# Patient Record
Sex: FEMALE | Race: BLACK OR AFRICAN AMERICAN | Employment: FULL TIME | URBAN - METROPOLITAN AREA
[De-identification: names, ages, dates, MRNs, and addresses within clinical notes are randomized per-mention and may not be internally consistent; named-entity substitution may affect disease eponyms.]

---

## 2017-05-30 ENCOUNTER — HOSPITAL ENCOUNTER (OUTPATIENT)
Dept: MAMMOGRAPHY | Age: 52
Discharge: HOME | End: 2017-05-30
Attending: FAMILY MEDICINE
Payer: COMMERCIAL

## 2017-05-30 DIAGNOSIS — Z12.31 VISIT FOR SCREENING MAMMOGRAM: ICD-10-CM

## 2017-05-30 PROCEDURE — 77067 SCR MAMMO BI INCL CAD: CPT

## 2018-06-04 ENCOUNTER — HOSPITAL ENCOUNTER (OUTPATIENT)
Dept: MAMMOGRAPHY | Age: 53
Discharge: HOME | End: 2018-06-04
Attending: FAMILY MEDICINE
Payer: COMMERCIAL

## 2018-06-04 DIAGNOSIS — Z12.31 VISIT FOR SCREENING MAMMOGRAM: ICD-10-CM

## 2018-06-04 PROCEDURE — 77063 BREAST TOMOSYNTHESIS BI: CPT

## 2018-07-31 ENCOUNTER — HOSPITAL ENCOUNTER (OUTPATIENT)
Dept: ULTRASOUND IMAGING | Age: 53
Discharge: HOME | End: 2018-07-31
Attending: SPECIALIST
Payer: COMMERCIAL

## 2018-07-31 DIAGNOSIS — E11.9 DIABETES MELLITUS (HCC): ICD-10-CM

## 2018-07-31 DIAGNOSIS — E78.00 HYPERCHOLESTEROLEMIA: ICD-10-CM

## 2018-07-31 DIAGNOSIS — R79.89 ABNORMAL LIVER FUNCTION TEST: ICD-10-CM

## 2018-07-31 PROCEDURE — 76700 US EXAM ABDOM COMPLETE: CPT

## 2019-05-01 ENCOUNTER — HOSPITAL ENCOUNTER (EMERGENCY)
Age: 54
Discharge: HOME | End: 2019-05-01
Attending: EMERGENCY MEDICINE
Payer: COMMERCIAL

## 2019-05-01 VITALS
HEART RATE: 66 BPM | TEMPERATURE: 98.6 F | RESPIRATION RATE: 18 BRPM | DIASTOLIC BLOOD PRESSURE: 84 MMHG | SYSTOLIC BLOOD PRESSURE: 133 MMHG | OXYGEN SATURATION: 99 %

## 2019-05-01 DIAGNOSIS — M54.50 ACUTE BILATERAL LOW BACK PAIN WITHOUT SCIATICA: Primary | ICD-10-CM

## 2019-05-01 LAB
ALBUMIN SERPL-MCNC: 4.1 G/DL (ref 3.5–5)
ALBUMIN/GLOB SERPL: 1 {RATIO} (ref 1.1–2.2)
ALP SERPL-CCNC: 123 U/L (ref 45–117)
ALT SERPL-CCNC: 75 U/L (ref 12–78)
ANION GAP SERPL CALC-SCNC: 8 MMOL/L (ref 5–15)
APPEARANCE UR: ABNORMAL
AST SERPL-CCNC: 58 U/L (ref 15–37)
BACTERIA URNS QL MICRO: NEGATIVE /HPF
BASOPHILS # BLD: 0.1 K/UL (ref 0–0.1)
BASOPHILS NFR BLD: 1 % (ref 0–1)
BILIRUB SERPL-MCNC: 0.7 MG/DL (ref 0.2–1)
BILIRUB UR QL: NEGATIVE
BUN SERPL-MCNC: 16 MG/DL (ref 6–20)
BUN/CREAT SERPL: 17 (ref 12–20)
CALCIUM SERPL-MCNC: 9.6 MG/DL (ref 8.5–10.1)
CHLORIDE SERPL-SCNC: 105 MMOL/L (ref 97–108)
CK SERPL-CCNC: 81 U/L (ref 26–192)
CO2 SERPL-SCNC: 27 MMOL/L (ref 21–32)
COLOR UR: ABNORMAL
COMMENT, HOLDF: NORMAL
CREAT SERPL-MCNC: 0.92 MG/DL (ref 0.55–1.02)
DIFFERENTIAL METHOD BLD: ABNORMAL
EOSINOPHIL # BLD: 0.1 K/UL (ref 0–0.4)
EOSINOPHIL NFR BLD: 1 % (ref 0–7)
EPITH CASTS URNS QL MICRO: ABNORMAL /LPF
ERYTHROCYTE [DISTWIDTH] IN BLOOD BY AUTOMATED COUNT: 17 % (ref 11.5–14.5)
GLOBULIN SER CALC-MCNC: 4.1 G/DL (ref 2–4)
GLUCOSE SERPL-MCNC: 107 MG/DL (ref 65–100)
GLUCOSE UR STRIP.AUTO-MCNC: NEGATIVE MG/DL
HCT VFR BLD AUTO: 39.8 % (ref 35–47)
HGB BLD-MCNC: 11.9 G/DL (ref 11.5–16)
HGB UR QL STRIP: NEGATIVE
HYALINE CASTS URNS QL MICRO: ABNORMAL /LPF (ref 0–5)
IMM GRANULOCYTES # BLD AUTO: 0.1 K/UL (ref 0–0.04)
IMM GRANULOCYTES NFR BLD AUTO: 1 % (ref 0–0.5)
KETONES UR QL STRIP.AUTO: ABNORMAL MG/DL
LEUKOCYTE ESTERASE UR QL STRIP.AUTO: NEGATIVE
LYMPHOCYTES # BLD: 2.6 K/UL (ref 0.8–3.5)
LYMPHOCYTES NFR BLD: 26 % (ref 12–49)
MCH RBC QN AUTO: 23 PG (ref 26–34)
MCHC RBC AUTO-ENTMCNC: 29.9 G/DL (ref 30–36.5)
MCV RBC AUTO: 76.8 FL (ref 80–99)
MONOCYTES # BLD: 0.7 K/UL (ref 0–1)
MONOCYTES NFR BLD: 7 % (ref 5–13)
NEUTS SEG # BLD: 6.3 K/UL (ref 1.8–8)
NEUTS SEG NFR BLD: 64 % (ref 32–75)
NITRITE UR QL STRIP.AUTO: NEGATIVE
NRBC # BLD: 0 K/UL (ref 0–0.01)
NRBC BLD-RTO: 0 PER 100 WBC
PH UR STRIP: 6 [PH] (ref 5–8)
PLATELET # BLD AUTO: 292 K/UL (ref 150–400)
PMV BLD AUTO: 9.4 FL (ref 8.9–12.9)
POTASSIUM SERPL-SCNC: 3.2 MMOL/L (ref 3.5–5.1)
PROT SERPL-MCNC: 8.2 G/DL (ref 6.4–8.2)
PROT UR STRIP-MCNC: NEGATIVE MG/DL
RBC # BLD AUTO: 5.18 M/UL (ref 3.8–5.2)
RBC #/AREA URNS HPF: ABNORMAL /HPF (ref 0–5)
SAMPLES BEING HELD,HOLD: NORMAL
SODIUM SERPL-SCNC: 140 MMOL/L (ref 136–145)
SP GR UR REFRACTOMETRY: 1.02 (ref 1–1.03)
UR CULT HOLD, URHOLD: NORMAL
UROBILINOGEN UR QL STRIP.AUTO: 0.2 EU/DL (ref 0.2–1)
WBC # BLD AUTO: 9.8 K/UL (ref 3.6–11)
WBC URNS QL MICRO: ABNORMAL /HPF (ref 0–4)

## 2019-05-01 PROCEDURE — 80053 COMPREHEN METABOLIC PANEL: CPT

## 2019-05-01 PROCEDURE — 81001 URINALYSIS AUTO W/SCOPE: CPT

## 2019-05-01 PROCEDURE — 74011250636 HC RX REV CODE- 250/636

## 2019-05-01 PROCEDURE — 99283 EMERGENCY DEPT VISIT LOW MDM: CPT

## 2019-05-01 PROCEDURE — 36415 COLL VENOUS BLD VENIPUNCTURE: CPT

## 2019-05-01 PROCEDURE — 96374 THER/PROPH/DIAG INJ IV PUSH: CPT

## 2019-05-01 PROCEDURE — 85025 COMPLETE CBC W/AUTO DIFF WBC: CPT

## 2019-05-01 PROCEDURE — 82550 ASSAY OF CK (CPK): CPT

## 2019-05-01 PROCEDURE — 74011250636 HC RX REV CODE- 250/636: Performed by: PHYSICIAN ASSISTANT

## 2019-05-01 PROCEDURE — 96375 TX/PRO/DX INJ NEW DRUG ADDON: CPT

## 2019-05-01 PROCEDURE — 74011250637 HC RX REV CODE- 250/637: Performed by: PHYSICIAN ASSISTANT

## 2019-05-01 PROCEDURE — 74011000250 HC RX REV CODE- 250: Performed by: PHYSICIAN ASSISTANT

## 2019-05-01 RX ORDER — KETOROLAC TROMETHAMINE 30 MG/ML
30 INJECTION, SOLUTION INTRAMUSCULAR; INTRAVENOUS
Status: DISCONTINUED | OUTPATIENT
Start: 2019-05-01 | End: 2019-05-01

## 2019-05-01 RX ORDER — MORPHINE SULFATE 2 MG/ML
2 INJECTION, SOLUTION INTRAMUSCULAR; INTRAVENOUS
Status: COMPLETED | OUTPATIENT
Start: 2019-05-01 | End: 2019-05-01

## 2019-05-01 RX ORDER — LIDOCAINE 50 MG/G
1 PATCH TOPICAL EVERY 24 HOURS
Status: DISCONTINUED | OUTPATIENT
Start: 2019-05-01 | End: 2019-05-01 | Stop reason: HOSPADM

## 2019-05-01 RX ORDER — LIDOCAINE 40 MG/G
CREAM TOPICAL
Qty: 15 G | Refills: 0 | Status: SHIPPED | OUTPATIENT
Start: 2019-05-01 | End: 2020-02-13

## 2019-05-01 RX ORDER — MORPHINE SULFATE 2 MG/ML
4 INJECTION, SOLUTION INTRAMUSCULAR; INTRAVENOUS ONCE
Status: DISCONTINUED | OUTPATIENT
Start: 2019-05-01 | End: 2019-05-01

## 2019-05-01 RX ORDER — ACETAMINOPHEN 500 MG
1000 TABLET ORAL
Status: COMPLETED | OUTPATIENT
Start: 2019-05-01 | End: 2019-05-01

## 2019-05-01 RX ORDER — METHOCARBAMOL 500 MG/1
500 TABLET, FILM COATED ORAL
Qty: 20 TAB | Refills: 0 | Status: SHIPPED | OUTPATIENT
Start: 2019-05-01 | End: 2020-02-13

## 2019-05-01 RX ORDER — MORPHINE SULFATE 2 MG/ML
INJECTION, SOLUTION INTRAMUSCULAR; INTRAVENOUS
Status: COMPLETED
Start: 2019-05-01 | End: 2019-05-01

## 2019-05-01 RX ORDER — KETOROLAC TROMETHAMINE 30 MG/ML
15 INJECTION, SOLUTION INTRAMUSCULAR; INTRAVENOUS
Status: COMPLETED | OUTPATIENT
Start: 2019-05-01 | End: 2019-05-01

## 2019-05-01 RX ORDER — MELOXICAM 15 MG/1
15 TABLET ORAL DAILY
Qty: 30 TAB | Refills: 0 | Status: SHIPPED | OUTPATIENT
Start: 2019-05-01 | End: 2019-05-31

## 2019-05-01 RX ADMIN — ACETAMINOPHEN 1000 MG: 500 TABLET ORAL at 13:23

## 2019-05-01 RX ADMIN — MORPHINE SULFATE 2 MG: 2 INJECTION, SOLUTION INTRAMUSCULAR; INTRAVENOUS at 13:33

## 2019-05-01 RX ADMIN — KETOROLAC TROMETHAMINE 15 MG: 30 INJECTION, SOLUTION INTRAMUSCULAR at 13:24

## 2019-05-01 NOTE — ED TRIAGE NOTES
Star presents from home with complaints of low back pain that started on Monday when she was sitting on the couch. Patient reports 10/10 low back pain that is spreading to her hips and down her thighs bilaterally. Patient denies trauma

## 2019-05-01 NOTE — LETTER
[address] 
[address] [facility] [address] [id number] 
[phone] Work/School Note Date: 5/1/2019 To Whom It May concern: 
 
Yao Fountain was seen and treated today in the emergency room by the following provider(s): 
Attending Provider: Nazia Navas DO Physician Assistant: DWIGHT Cross. Yao Fountain may return to work on 5/7/19. Sincerely, DWIGHT Funez

## 2019-05-01 NOTE — ED PROVIDER NOTES
45 y/o female with PMHx of DM, HTN, HLD, heart murmur, and GERD, presenting with complaint of back pain. The patient reports an onset of lower back pain 2 days ago while she was sitting down. She denies any recent falls, heavy lifting or other injuries, but states she has had similar pain in the past and has seen an orthopedist. She was seen at [address] [place] [address] ED on 3/28 for similar pain, was diagnosed with a UTI, and was treated with keflex and flexeril. Her pain is currently 10/10, radiating to bilateral thighs, not relieved by flexeril, aleve or heating pad. She is concerned her pain might be related to her statin medication. She denies fevers, weakness, numbness, saddle anesthesia, bladder/bowel dysfunction, or dysuria. The history is provided by the patient. Past Medical History:  
Diagnosis Date  Diabetes ([address]  GERD (gastroesophageal reflux disease)  Heart murmur  Hyperlipemia  Hypertension  Tachycardia Past Surgical History:  
Procedure Laterality Date  BREAST SURGERY PROCEDURE UNLISTED    
 LEFT BREAST MILK DUCT REMOVED  CARDIAC SURG PROCEDURE UNLIST  1/2015 CARDIAC CATH  
 HX BREAST BIOPSY Left [id number]  
 all benign  HX HEENT    
 ZACARIAS EYE MUSCLE TIGHTENING AS CHILD  
 HX ISABEL AND BSO  HX TONSIL AND ADENOIDECTOMY    
 AGE 15  
 HX TUBAL LIGATION Family History:  
Problem Relation Age of Onset  Diabetes Mother  Elevated Lipids Mother  Hypertension Mother  Heart Disease Father AFIB  Hypertension Father  Elevated Lipids Father  Diabetes Father  Cancer Brother BRAIN  
 Breast Cancer Paternal Aunt  Anesth Problems Neg Hx Social History Socioeconomic History  Marital status:  Spouse name: Not on file  Number of children: Not on file  Years of education: Not on file  Highest education level: Not on file Occupational History  Not on file Social Needs  Financial resource strain: Not on file  Food insecurity:  
  Worry: Not on file Inability: Not on file  Transportation needs:  
  Medical: Not on file Non-medical: Not on file Tobacco Use  Smoking status: Never Smoker  Smokeless tobacco: Never Used Substance and Sexual Activity  Alcohol use: Yes Comment: SOCIALLY  Drug use: Not on file  Sexual activity: Not on file Lifestyle  Physical activity:  
  Days per week: Not on file Minutes per session: Not on file  Stress: Not on file Relationships  Social connections:  
  Talks on phone: Not on file Gets together: Not on file Attends Jehovah's witness service: Not on file Active member of club or organization: Not on file Attends meetings of clubs or organizations: Not on file Relationship status: Not on file  Intimate partner violence:  
  Fear of current or ex partner: Not on file Emotionally abused: Not on file Physically abused: Not on file Forced sexual activity: Not on file Other Topics Concern  Not on file Social History Narrative  Not on file ALLERGIES: Patient has no known allergies. Review of Systems Constitutional: Negative for chills and fever. Respiratory: Negative for shortness of breath. Cardiovascular: Negative for chest pain. Gastrointestinal: Negative for diarrhea, nausea and vomiting. Genitourinary: Negative for dysuria, frequency and urgency. Musculoskeletal: Positive for back pain. Negative for neck pain. Neurological: Negative for weakness and numbness. All other systems reviewed and are negative. Vitals:  
 05/01/19 1216 05/01/19 1249 BP:  (!) 170/97 Pulse: 90 82 Resp:  16 Temp:  98.3 °F (36.8 °C) SpO2: 99% 100% Physical Exam  
Constitutional: She is oriented to person, place, and time. She appears well-developed and well-nourished. No distress. HENT:  
Head: Normocephalic and atraumatic. Eyes: Conjunctivae and EOM are normal.  
Neck: Normal range of motion. Neck supple. Cardiovascular: Normal rate, regular rhythm and normal heart sounds. Pulmonary/Chest: Effort normal and breath sounds normal.  
Neurological: She is alert and oriented to person, place, and time. 5/5 strength of bilateral lower extremities with intact light touch sensation. Ambulatory without assistance. Skin: Skin is warm and dry. She is not diaphoretic. Nursing note and vitals reviewed. MDM Number of Diagnoses or Management Options Acute bilateral low back pain without sciatica:  
  
Amount and/or Complexity of Data Reviewed Clinical lab tests: ordered and reviewed Patient Progress Patient progress: stable Procedures 45 y/o female with PMHx of DM, HTN, HLD, heart murmur, and GERD, presenting with complaint of back pain. History and exam as documented above, most consistent with muscular back pain vs small disc herniation. No history of trauma or midline tenderness - doubt fractures or ligamentous injuries. No neuro deficits or history of saddle anesthesia or bladder/bowel dysfunction - doubt cauda equina syndrome or acute cord compression. Nothing in the history or exam to concern me for AAA, renal pathology or epidural abscess. Safe for discharge home, with Rx for mobic, robaxin and topical lidocaine cream. Recommended prompt PCP follow up. Strict ED return precautions discussed and provided in writing at time of discharge. The patient verbalized understanding and agreement with this plan.

## 2019-07-24 ENCOUNTER — HOSPITAL ENCOUNTER (OUTPATIENT)
Dept: MAMMOGRAPHY | Age: 54
Discharge: HOME | End: 2019-07-24
Attending: FAMILY MEDICINE
Payer: COMMERCIAL

## 2019-07-24 DIAGNOSIS — Z12.39 SCREENING BREAST EXAMINATION: ICD-10-CM

## 2019-07-24 PROCEDURE — 77063 BREAST TOMOSYNTHESIS BI: CPT

## 2020-01-13 ENCOUNTER — OFFICE VISIT (OUTPATIENT)
Dept: CARDIOLOGY CLINIC | Age: 55
End: 2020-01-13

## 2020-01-13 ENCOUNTER — CLINICAL SUPPORT (OUTPATIENT)
Dept: CARDIOLOGY CLINIC | Age: 55
End: 2020-01-13

## 2020-01-13 VITALS
RESPIRATION RATE: 16 BRPM | SYSTOLIC BLOOD PRESSURE: 138 MMHG | HEART RATE: 82 BPM | BODY MASS INDEX: 36.76 KG/M2 | WEIGHT: 234.2 LBS | DIASTOLIC BLOOD PRESSURE: 86 MMHG | HEIGHT: 67 IN | OXYGEN SATURATION: 98 %

## 2020-01-13 DIAGNOSIS — R00.0 RAPID HEART RATE: Primary | ICD-10-CM

## 2020-01-13 DIAGNOSIS — E66.01 SEVERE OBESITY (HCC): ICD-10-CM

## 2020-01-13 DIAGNOSIS — R00.0 RAPID HEART RATE: ICD-10-CM

## 2020-01-13 DIAGNOSIS — R42 DIZZINESS: ICD-10-CM

## 2020-01-13 DIAGNOSIS — R00.0 TACHYCARDIA: ICD-10-CM

## 2020-01-13 DIAGNOSIS — R00.2 PALPITATIONS: ICD-10-CM

## 2020-01-13 DIAGNOSIS — R07.9 CHEST PAIN, UNSPECIFIED TYPE: ICD-10-CM

## 2020-01-13 RX ORDER — ATORVASTATIN CALCIUM 10 MG/1
1 TABLET, FILM COATED ORAL DAILY
COMMUNITY
Start: 2019-01-12

## 2020-01-13 RX ORDER — METOPROLOL TARTRATE 50 MG/1
1 TABLET ORAL 2 TIMES DAILY
COMMUNITY
Start: 2019-02-11 | End: 2020-01-28 | Stop reason: ALTCHOICE

## 2020-01-13 NOTE — PROGRESS NOTES
HISTORY OF PRESENT ILLNESS  Adelita Hedrick is a 53 y.o. female. She is seen for evaluation of paroxysmal tachycardia and palpitations. I saw her five years ago and she had a normal heart catheterization at that time for chest discomfort and abnormal stress testing. In May 2019 she was noted on lab work to have borderline anemia with low mean corpuscular volume and low potassium. Recently she has been having episodes where her heart rate will go to 130-150 suddenly and many times, it occurs at night. She also has occasional chest tightness and dizziness but states she can walk without difficulty. She may have lost some weight. She does not smoke or drink alcohol to excess. Her family history is noncontributory. HPI  Patient Active Problem List   Diagnosis Code    Tear of meniscus of left knee S83.207A    Severe obesity ([facility] E66.01     Current Outpatient Medications   Medication Sig Dispense Refill    metoprolol tartrate (LOPRESSOR) 50 mg tablet Take 1 Tab by mouth two (2) times a day.  SITagliptin-metFORMIN (JANUMET) 50-1,000 mg per tablet Take 1 Tab by mouth two (2) times a day.  atorvastatin (LIPITOR) 10 mg tablet Take 1 Tab by mouth daily.  aspirin 81 mg chewable tablet Take 81 mg by mouth daily.  triamterene-hydrochlorothiazide (DYAZIDE) 37.5-25 mg per capsule Take 1 capsule by mouth daily.  lidocaine (XYLOCAINE) 4 % topical cream Apply  to affected area two (2) times daily as needed for Pain. 15 g 0    methocarbamol (ROBAXIN) 500 mg tablet Take 1 Tab by mouth two (2) times daily as needed for Pain. 20 Tab 0    oxyCODONE-acetaminophen (PERCOCET) 5-325 mg per tablet Take 1 Tab by mouth every four (4) hours as needed for Pain. Max Daily Amount: 6 Tabs. 42 Tab 0    metFORMIN (GLUCOPHAGE) 500 mg tablet Take 1 tablet by mouth two (2) times daily (with meals). 60 tablet 11    ATORVASTATIN CALCIUM (ATORVASTATIN PO) Take 10 mg by mouth daily.        Past Medical History:   Diagnosis Date    Diabetes ([address]     GERD (gastroesophageal reflux disease)     Heart murmur     Hyperlipemia     Hypertension     Tachycardia      Past Surgical History:   Procedure Laterality Date    BREAST SURGERY PROCEDURE UNLISTED      LEFT BREAST MILK DUCT REMOVED    CARDIAC SURG PROCEDURE UNLIST  1/2015    CARDIAC CATH    HX BREAST BIOPSY Left 1990,2003,2008    all benign    HX HEENT      ZACARIAS EYE MUSCLE TIGHTENING AS CHILD    HX ISABEL AND BSO      HX TONSIL AND ADENOIDECTOMY      AGE 15    HX TUBAL LIGATION         Review of Systems   Constitutional: Positive for weight loss. Cardiovascular: Positive for chest pain and palpitations. Neurological: Positive for dizziness. All other systems reviewed and are negative. Visit Vitals  /86 (BP 1 Location: Left arm, BP Patient Position: Sitting)   Pulse 82   Resp 16   Ht 5' 7\" (1.702 m)   Wt 234 lb 3.2 oz (106.2 kg)   SpO2 98%   BMI 36.68 kg/m²       Physical Exam  Vitals signs and nursing note reviewed. Constitutional:       Appearance: Normal appearance. HENT:      Head: Normocephalic. Right Ear: External ear normal.      Nose: Nose normal.      Mouth/Throat:      Mouth: Mucous membranes are moist.   Neck:      Musculoskeletal: Normal range of motion. Cardiovascular:      Rate and Rhythm: Normal rate and regular rhythm. Heart sounds: No murmur. No friction rub. No gallop. Pulmonary:      Effort: Pulmonary effort is normal. No respiratory distress. Breath sounds: No wheezing. Chest:      Chest wall: No tenderness. Abdominal:      Palpations: Abdomen is soft. Musculoskeletal:         General: No swelling. Skin:     Coloration: Skin is not jaundiced. Neurological:      General: No focal deficit present. Mental Status: She is alert.    Psychiatric:         Behavior: Behavior normal.         ASSESSMENT and PLAN  She is now having episodic palpitations which are worrisome for supraventricular arrhythmias possibly atrial fibrillation. I will therefore start with a 48-hour monitor and also have her return for an echocardiogram once the monitor is completed for follow up. She is on a beta blocker at present.

## 2020-01-20 ENCOUNTER — TELEPHONE (OUTPATIENT)
Dept: CARDIOLOGY CLINIC | Age: 55
End: 2020-01-20

## 2020-01-28 ENCOUNTER — OFFICE VISIT (OUTPATIENT)
Dept: CARDIOLOGY CLINIC | Age: 55
End: 2020-01-28

## 2020-01-28 VITALS
OXYGEN SATURATION: 98 % | SYSTOLIC BLOOD PRESSURE: 138 MMHG | HEIGHT: 67 IN | DIASTOLIC BLOOD PRESSURE: 86 MMHG | BODY MASS INDEX: 36.73 KG/M2 | WEIGHT: 234 LBS | HEART RATE: 86 BPM | RESPIRATION RATE: 18 BRPM

## 2020-01-28 DIAGNOSIS — R42 DIZZINESS: ICD-10-CM

## 2020-01-28 DIAGNOSIS — R00.0 RAPID HEART RATE: ICD-10-CM

## 2020-01-28 DIAGNOSIS — I11.0 HYPERTENSIVE HEART DISEASE WITH CONGESTIVE HEART FAILURE, UNSPECIFIED HEART FAILURE TYPE (HCC): ICD-10-CM

## 2020-01-28 DIAGNOSIS — R00.2 PALPITATIONS: Primary | ICD-10-CM

## 2020-01-28 DIAGNOSIS — E66.01 SEVERE OBESITY (HCC): ICD-10-CM

## 2020-01-28 DIAGNOSIS — R00.0 TACHYCARDIA: ICD-10-CM

## 2020-01-28 DIAGNOSIS — I10 ESSENTIAL HYPERTENSION: ICD-10-CM

## 2020-01-28 RX ORDER — CARVEDILOL 12.5 MG/1
12.5 TABLET ORAL 2 TIMES DAILY WITH MEALS
Qty: 60 TAB | Refills: 5 | Status: SHIPPED | OUTPATIENT
Start: 2020-01-28 | End: 2020-02-13

## 2020-01-28 RX ORDER — LOSARTAN POTASSIUM AND HYDROCHLOROTHIAZIDE 12.5; 5 MG/1; MG/1
1 TABLET ORAL DAILY
Qty: 30 TAB | Refills: 5 | Status: SHIPPED | OUTPATIENT
Start: 2020-01-28 | End: 2020-02-13 | Stop reason: DRUGHIGH

## 2020-01-28 NOTE — PROGRESS NOTES
HISTORY OF PRESENT ILLNESS  Yao Fountain is a 53 y.o. female. She is seen in follow up for palpitations and treated hypertension. The palpitations may be slightly diminished with cutting back on chocolate usage but they are still present once a day. Last year blood work in May showed a low potassium. She takes Dyazide and also metoprolol twice daily. An echocardiogram in the office today showed normal left ventricular function but there was mild to moderate left ventricular hypertrophy and some left atrial enlargement. Mild mitral regurgitation was noted. HPI  Patient Active Problem List   Diagnosis Code    Tear of meniscus of left knee S83.207A    Severe obesity ([facility] E66.01     Current Outpatient Medications   Medication Sig Dispense Refill    losartan-hydroCHLOROthiazide (HYZAAR) 50-12.5 mg per tablet Take 1 Tab by mouth daily. 30 Tab 5    carvediloL (COREG) 12.5 mg tablet Take 1 Tab by mouth two (2) times daily (with meals). 60 Tab 5    SITagliptin-metFORMIN (JANUMET) 50-1,000 mg per tablet Take 1 Tab by mouth two (2) times a day.  atorvastatin (LIPITOR) 10 mg tablet Take 1 Tab by mouth daily.  lidocaine (XYLOCAINE) 4 % topical cream Apply  to affected area two (2) times daily as needed for Pain. 15 g 0    methocarbamol (ROBAXIN) 500 mg tablet Take 1 Tab by mouth two (2) times daily as needed for Pain. 20 Tab 0    aspirin 81 mg chewable tablet Take 81 mg by mouth daily.        Past Medical History:   Diagnosis Date    Diabetes ([facility] [address] [facility]     GERD (gastroesophageal reflux disease)     Heart murmur     Hyperlipemia     Hypertension     Tachycardia      Past Surgical History:   Procedure Laterality Date    BREAST SURGERY PROCEDURE UNLISTED      LEFT BREAST MILK DUCT REMOVED    CARDIAC SURG PROCEDURE UNLIST  1/2015    CARDIAC CATH    HX BREAST BIOPSY Left 1990,2003,2008    all benign    HX HEENT      ZACARIAS EYE MUSCLE TIGHTENING AS CHILD    HX ISABEL AND BSO      HX TONSIL AND ADENOIDECTOMY AGE 15    HX TUBAL LIGATION         Review of Systems   Cardiovascular: Positive for palpitations. All other systems reviewed and are negative. Visit Vitals  /86 (BP 1 Location: Left arm, BP Patient Position: Sitting)   Pulse 86   Resp 18   Ht 5' 7\" (1.702 m)   Wt 234 lb (106.1 kg)   SpO2 98%   BMI 36.65 kg/m²       Physical Exam  Vitals signs and nursing note reviewed. Constitutional:       Appearance: She is obese. HENT:      Head: Normocephalic. Right Ear: External ear normal.      Nose: Nose normal.      Mouth/Throat:      Mouth: Mucous membranes are moist.   Neck:      Musculoskeletal: Normal range of motion. Cardiovascular:      Rate and Rhythm: Normal rate and regular rhythm. Heart sounds: No murmur. No friction rub. No gallop. Pulmonary:      Breath sounds: Normal breath sounds. No wheezing or rales. Abdominal:      Palpations: Abdomen is soft. Musculoskeletal:         General: No swelling. Skin:     Coloration: Skin is not jaundiced. Neurological:      General: No focal deficit present. Mental Status: She is alert. Psychiatric:         Behavior: Behavior normal.         ASSESSMENT and PLAN  Her blood pressure is borderline controlled and she continues to be symptomatic with palpitations. She did have a 48-hour monitor that showed only occasional supraventricular ectopic beats but no atrial fibrillation. I believe she may improve with different blood pressure regimen that may be more effective in controlling her pressure and her ectopy as well as conserving potassium and magnesium. Therefore, I will stop the Dyazide and metoprolol. I will start her instead on Hyzaar 50/12.5 mg once daily as well as carvedilol 12.5 mg twice daily and I will see her back in four weeks.

## 2020-02-05 ENCOUNTER — HOSPITAL ENCOUNTER (EMERGENCY)
Age: 55
Discharge: HOME | End: 2020-02-05
Attending: EMERGENCY MEDICINE
Payer: COMMERCIAL

## 2020-02-05 VITALS
TEMPERATURE: 98 F | DIASTOLIC BLOOD PRESSURE: 91 MMHG | OXYGEN SATURATION: 99 % | RESPIRATION RATE: 18 BRPM | HEART RATE: 72 BPM | SYSTOLIC BLOOD PRESSURE: 162 MMHG

## 2020-02-05 DIAGNOSIS — G44.1 OTHER VASCULAR HEADACHE: Primary | ICD-10-CM

## 2020-02-05 DIAGNOSIS — R19.7 DIARRHEA, UNSPECIFIED TYPE: ICD-10-CM

## 2020-02-05 LAB
ALBUMIN SERPL-MCNC: 4.3 G/DL (ref 3.5–5)
ALBUMIN/GLOB SERPL: 1.1 {RATIO} (ref 1.1–2.2)
ALP SERPL-CCNC: 116 U/L (ref 45–117)
ALT SERPL-CCNC: 35 U/L (ref 12–78)
ANION GAP SERPL CALC-SCNC: 3 MMOL/L (ref 5–15)
AST SERPL-CCNC: 42 U/L (ref 15–37)
BASOPHILS # BLD: 0 K/UL (ref 0–0.1)
BASOPHILS NFR BLD: 0 % (ref 0–1)
BILIRUB SERPL-MCNC: 0.5 MG/DL (ref 0.2–1)
BUN SERPL-MCNC: 12 MG/DL (ref 6–20)
BUN/CREAT SERPL: 13 (ref 12–20)
CALCIUM SERPL-MCNC: 9.6 MG/DL (ref 8.5–10.1)
CHLORIDE SERPL-SCNC: 106 MMOL/L (ref 97–108)
CO2 SERPL-SCNC: 27 MMOL/L (ref 21–32)
COMMENT, HOLDF: NORMAL
CREAT SERPL-MCNC: 0.89 MG/DL (ref 0.55–1.02)
DIFFERENTIAL METHOD BLD: ABNORMAL
EOSINOPHIL # BLD: 0.2 K/UL (ref 0–0.4)
EOSINOPHIL NFR BLD: 1 % (ref 0–7)
ERYTHROCYTE [DISTWIDTH] IN BLOOD BY AUTOMATED COUNT: 19.2 % (ref 11.5–14.5)
GLOBULIN SER CALC-MCNC: 3.8 G/DL (ref 2–4)
GLUCOSE SERPL-MCNC: 151 MG/DL (ref 65–100)
HCT VFR BLD AUTO: 37.1 % (ref 35–47)
HGB BLD-MCNC: 11.2 G/DL (ref 11.5–16)
IMM GRANULOCYTES # BLD AUTO: 0.1 K/UL (ref 0–0.04)
IMM GRANULOCYTES NFR BLD AUTO: 1 % (ref 0–0.5)
LYMPHOCYTES # BLD: 3.7 K/UL (ref 0.8–3.5)
LYMPHOCYTES NFR BLD: 32 % (ref 12–49)
MCH RBC QN AUTO: 23 PG (ref 26–34)
MCHC RBC AUTO-ENTMCNC: 30.2 G/DL (ref 30–36.5)
MCV RBC AUTO: 76.3 FL (ref 80–99)
MONOCYTES # BLD: 0.7 K/UL (ref 0–1)
MONOCYTES NFR BLD: 6 % (ref 5–13)
NEUTS SEG # BLD: 6.8 K/UL (ref 1.8–8)
NEUTS SEG NFR BLD: 60 % (ref 32–75)
NRBC # BLD: 0 K/UL (ref 0–0.01)
NRBC BLD-RTO: 0 PER 100 WBC
PLATELET # BLD AUTO: 262 K/UL (ref 150–400)
PMV BLD AUTO: 9.7 FL (ref 8.9–12.9)
POTASSIUM SERPL-SCNC: 4.8 MMOL/L (ref 3.5–5.1)
PROT SERPL-MCNC: 8.1 G/DL (ref 6.4–8.2)
RBC # BLD AUTO: 4.86 M/UL (ref 3.8–5.2)
SAMPLES BEING HELD,HOLD: NORMAL
SODIUM SERPL-SCNC: 136 MMOL/L (ref 136–145)
WBC # BLD AUTO: 11.4 K/UL (ref 3.6–11)

## 2020-02-05 PROCEDURE — 85025 COMPLETE CBC W/AUTO DIFF WBC: CPT

## 2020-02-05 PROCEDURE — 96374 THER/PROPH/DIAG INJ IV PUSH: CPT

## 2020-02-05 PROCEDURE — 36415 COLL VENOUS BLD VENIPUNCTURE: CPT

## 2020-02-05 PROCEDURE — 99284 EMERGENCY DEPT VISIT MOD MDM: CPT

## 2020-02-05 PROCEDURE — 96375 TX/PRO/DX INJ NEW DRUG ADDON: CPT

## 2020-02-05 PROCEDURE — 74011250637 HC RX REV CODE- 250/637: Performed by: NURSE PRACTITIONER

## 2020-02-05 PROCEDURE — 74011250636 HC RX REV CODE- 250/636: Performed by: NURSE PRACTITIONER

## 2020-02-05 PROCEDURE — 80053 COMPREHEN METABOLIC PANEL: CPT

## 2020-02-05 RX ORDER — ONDANSETRON 2 MG/ML
4 INJECTION INTRAMUSCULAR; INTRAVENOUS
Status: COMPLETED | OUTPATIENT
Start: 2020-02-05 | End: 2020-02-05

## 2020-02-05 RX ORDER — BUTALBITAL, ACETAMINOPHEN AND CAFFEINE 300; 40; 50 MG/1; MG/1; MG/1
1 CAPSULE ORAL
Qty: 15 CAP | Refills: 0 | Status: SHIPPED | OUTPATIENT
Start: 2020-02-05 | End: 2022-03-28

## 2020-02-05 RX ORDER — KETOROLAC TROMETHAMINE 30 MG/ML
30 INJECTION, SOLUTION INTRAMUSCULAR; INTRAVENOUS
Status: COMPLETED | OUTPATIENT
Start: 2020-02-05 | End: 2020-02-05

## 2020-02-05 RX ORDER — BUTALBITAL, ACETAMINOPHEN AND CAFFEINE 50; 325; 40 MG/1; MG/1; MG/1
1 TABLET ORAL
Status: COMPLETED | OUTPATIENT
Start: 2020-02-05 | End: 2020-02-05

## 2020-02-05 RX ADMIN — ONDANSETRON 4 MG: 2 INJECTION INTRAMUSCULAR; INTRAVENOUS at 17:47

## 2020-02-05 RX ADMIN — KETOROLAC TROMETHAMINE 30 MG: 30 INJECTION, SOLUTION INTRAMUSCULAR at 17:47

## 2020-02-05 RX ADMIN — SODIUM CHLORIDE, SODIUM LACTATE, POTASSIUM CHLORIDE, AND CALCIUM CHLORIDE 1000 ML: 600; 310; 30; 20 INJECTION, SOLUTION INTRAVENOUS at 17:47

## 2020-02-05 RX ADMIN — BUTALBITAL, ACETAMINOPHEN AND CAFFEINE 1 TABLET: 50; 325; 40 TABLET ORAL at 19:07

## 2020-02-05 NOTE — ED TRIAGE NOTES
Pt arrives via personal vehicle for c/o generalized HA that started around 11 am today. Pt reports elevated BP (175/100), HR(130s). Denies photophobia, vision changes, dizziness/lightheadedness, numbness/tingling.

## 2020-02-05 NOTE — LETTER
NOTIFICATION RETURN TO WORK / SCHOOL 
 
2/5/2020 7:49 PM 
 
Ms. Sanjeev North 
[address] [facility] [id number] To Whom It May Concern: 
 
Sanjeev North is currently under the care of Elaine Botello [address] [place] [address]. She will return to work/school on: 2/6/2020 If there are questions or concerns please have the patient contact our office. Sincerely, Austen Canada NP

## 2020-02-05 NOTE — ED PROVIDER NOTES
Patient is a [age]year-old female with history of diabetes, heart murmur, hyperlipidemia, hypertension, and tachycardia who presents today for evaluation of a headache. She reports gradual onset of headache that started last night but around 11 AM it felt like it was getting a lot worse. Feels like a vice around her entire head. Endorses nausea with no vomiting. Denies any acute vision changes. Took Aleve prior to arrival with no improvement. Of note, she is followed by cardiologist and has recently had a Holter monitor for complaints of tachycardia and palpitations. She was told that the Holter monitor results were normal and did not show anything acute. Also when she followed up with her cardiologist 8 days ago echo showed normal ejection fraction with mild LVH mild MR. She was taken off metoprolol and Dyazide and started on losartan, hydrochlorothiazide, and carvedilol 12.5 mg. Patient states she still has sensation of racing heart and palpitations. In triage her heart rate was in the 80 and during the interview her heart rate was in the 80s when she was feeling her heart racing. Also noted her blood pressure was elevated as it also is in triage today. Denies any neck pain. Denies any fevers but endorses chills. Denies any cough cold or flu symptoms. Denies any urinary symptoms. 3 days ago she started having diarrhea. States she was on the toilet constantly. Last episode was earlier this morning. Denies any blood. Denies any acute abdominal pain but endorses that her stomach feels like it is (gurgling). No other acute complaints.                Past Medical History:   Diagnosis Date    Diabetes ([address]     GERD (gastroesophageal reflux disease)     Heart murmur     Hyperlipemia     Hypertension     Tachycardia        Past Surgical History:   Procedure Laterality Date    BREAST SURGERY PROCEDURE UNLISTED      LEFT BREAST MILK DUCT REMOVED    CARDIAC SURG PROCEDURE UNLIST  1/2015 CARDIAC CATH    HX BREAST BIOPSY Left 1990,2003,2008    all benign    HX HEENT      ZACARIAS EYE MUSCLE TIGHTENING AS CHILD    HX ISABEL AND BSO      HX TONSIL AND ADENOIDECTOMY      AGE 15    HX TUBAL LIGATION           Family History:   Problem Relation Age of Onset    Diabetes Mother     Elevated Lipids Mother     Hypertension Mother     Heart Disease Father         AFIB    Hypertension Father    Bylas Shaker Elevated Lipids Father     Diabetes Father     Cancer Brother         BRAIN    Breast Cancer Maternal Aunt         Age at diagnosis unknown     Anesth Problems Neg Hx        Social History     Socioeconomic History    Marital status:      Spouse name: Not on file    Number of children: Not on file    Years of education: Not on file    Highest education level: Not on file   Occupational History    Not on file   Social Needs    Financial resource strain: Not on file    Food insecurity:     Worry: Not on file     Inability: Not on file    Transportation needs:     Medical: Not on file     Non-medical: Not on file   Tobacco Use    Smoking status: Never Smoker    Smokeless tobacco: Never Used   Substance and Sexual Activity    Alcohol use: Yes     Frequency: Monthly or less     Drinks per session: 1 or 2     Binge frequency: Never     Comment: SOCIALLY    Drug use: Never    Sexual activity: Not on file   Lifestyle    Physical activity:     Days per week: Not on file     Minutes per session: Not on file    Stress: Not on file   Relationships    Social connections:     Talks on phone: Not on file     Gets together: Not on file     Attends Latter-day service: Not on file     Active member of club or organization: Not on file     Attends meetings of clubs or organizations: Not on file     Relationship status: Not on file    Intimate partner violence:     Fear of current or ex partner: Not on file     Emotionally abused: Not on file     Physically abused: Not on file     Forced sexual activity: Not on file   Other Topics Concern    Not on file   Social History Narrative    Not on file         ALLERGIES: Patient has no known allergies. Review of Systems   Constitutional: Positive for chills. Negative for fever. HENT: Positive for sinus pain. Negative for congestion and sore throat. Eyes: Negative for photophobia, pain and visual disturbance. Respiratory: Negative for shortness of breath. Cardiovascular: Positive for chest pain (Transient, chronic). Gastrointestinal: Positive for abdominal pain, diarrhea and nausea. Negative for vomiting. Genitourinary: Negative for difficulty urinating. Skin: Negative. Neurological: Positive for headaches. Negative for dizziness, light-headedness and numbness. Vitals:    02/05/20 1623   BP: (!) 181/91   Pulse: 85   Resp: 19   Temp: 98.1 °F (36.7 °C)   SpO2: 98%            Physical Exam  Vitals signs and nursing note reviewed. Constitutional:       Appearance: Normal appearance. HENT:      Head: Normocephalic and atraumatic. Comments: Bilateral frontal sinus tenderness to palpation. Mouth/Throat:      Mouth: Mucous membranes are moist.   Eyes:      Extraocular Movements: Extraocular movements intact. Pupils: Pupils are equal, round, and reactive to light. Comments: Mild bilateral conjunctival injection   Neck:      Musculoskeletal: Normal range of motion and neck supple. Cardiovascular:      Rate and Rhythm: Normal rate and regular rhythm. Pulses: Normal pulses. Heart sounds: Normal heart sounds. Pulmonary:      Effort: Pulmonary effort is normal.      Breath sounds: Normal breath sounds. Abdominal:      Palpations: Abdomen is soft. Tenderness: There is no abdominal tenderness. Musculoskeletal: Normal range of motion. Skin:     General: Skin is warm and dry. Neurological:      General: No focal deficit present. Mental Status: She is alert and oriented to person, place, and time.    Psychiatric: Mood and Affect: Mood normal.         Behavior: Behavior normal.          MDM  Number of Diagnoses or Management Options  Diarrhea, unspecified type: Other vascular headache:          Procedures    Patient is a 50-year-old female who presents today with gradual onset of a viselike headache around her head that started last night and got worse around 11:00 today. Endorses nausea with no vomiting. No photophobia. No acute neck pain or fevers or chills. She does endorse 3 days of watery diarrhea. She also recently changed her blood pressure medications about 10 days ago. On examination she has no acute neurological deficits. No gait difficulty. Has history of hypokalemia. Will check electrolytes treat her headache with Toradol and Zofran and give IV fluids and reevaluate. Headache is been gradual.  No trauma. Not anticoagulated. Neurologically she is intact. Will defer CT of the head at this time. 6:59 PM  Overall patient feels little better but her pain in her head is still persistent. Electrolytes are within normal limits. Potassium is normal.  Creatinine is normal.  We discussed options for treatment and will try 1 tablet Fioricet. If symptoms better will discharge home with prescription. Recheck vital signs: T 98.0- HR 74- /91  sats 98% on RA. Will try Fioricet for headache and reevaluate. 7:47 PM  Improvement after Fiorocet. Feeling much better. Ready for discharge. Will discharge her home with a few Fioricet and have her follow-up should her symptoms worsen anyway. Upon discharge her gait is steady. I encouraged her to stay home tonight and rest.  Should her symptoms worsen in any way, she is to return back to the ED.

## 2020-02-06 ENCOUNTER — TELEPHONE (OUTPATIENT)
Dept: CARDIOLOGY CLINIC | Age: 55
End: 2020-02-06

## 2020-02-06 RX ORDER — LOSARTAN POTASSIUM AND HYDROCHLOROTHIAZIDE 25; 100 MG/1; MG/1
1 TABLET ORAL DAILY
COMMUNITY
End: 2020-02-13 | Stop reason: DRUGHIGH

## 2020-02-06 NOTE — TELEPHONE ENCOUNTER
Called patient. Verified patient's identity with two identifiers. She saw Dr. Pamella Robbins January 28th in f/u for palpitations and htn. Medications were changed. She has been taking losartan-hctz 50-12.5 mg daily and carvedilol 12.5 mg bid. Yesterday she went to [address] [place] [address] ER with headache, high BP, fast HR. She had been having diarrhea a couple of days so labs were checked and she received fluids. She was given fioricet and sent home. Patient states today her BP is still elevated with , but sometimes higher. She stated 's yesterday before going to ER. She also still has a headache. She stated she watched salt intake. She asked if Dr. Pamella Robbins could review her labs, notes from yesterday, and advise any other changes. She has a f/u scheduled to see Dr. Pamella Robbins 2/24, which I told her we could move up if Dr. Pamella Robbins advises. Will call her back.        Future Appointments   Date Time Provider Octavia Gallo   2/24/2020  9:40 AM Jeanine Mejia  [address]

## 2020-02-06 NOTE — TELEPHONE ENCOUNTER
Patient requesting to speak with Dr. Belen Petty about some issues she's having.  Please advise    [facility]:[phone]

## 2020-02-06 NOTE — ED NOTES
Discharge paperwork given by provider, ambulated out of the department with a steady gait in no acute distress.

## 2020-02-06 NOTE — TELEPHONE ENCOUNTER
Called patient. Verified patient's identity with two identifiers. Notified patient of Dr. Jamilah Boland advice. She will double her medications and scheduled f/u for next week. Patient verbalized understanding and denied further questions or concerns.          Future Appointments   Date Time Provider Octavia Gallo   2/13/2020  9:40 AM Lisa Moore  [address]   2/24/2020  9:40 AM Lisa Moore  [address]

## 2020-02-06 NOTE — TELEPHONE ENCOUNTER
MD Corona Ray, RN   Caller: Unspecified (Today, 10:17 AM)             Double both new pills, so take total of Coreg 25 bid, and Hyzaar 100/25 every day, see in about a week.

## 2020-02-13 ENCOUNTER — OFFICE VISIT (OUTPATIENT)
Dept: CARDIOLOGY CLINIC | Age: 55
End: 2020-02-13

## 2020-02-13 VITALS
SYSTOLIC BLOOD PRESSURE: 150 MMHG | DIASTOLIC BLOOD PRESSURE: 84 MMHG | HEIGHT: 67 IN | WEIGHT: 232 LBS | RESPIRATION RATE: 17 BRPM | HEART RATE: 82 BPM | OXYGEN SATURATION: 97 % | BODY MASS INDEX: 36.41 KG/M2

## 2020-02-13 DIAGNOSIS — R00.0 TACHYCARDIA: ICD-10-CM

## 2020-02-13 DIAGNOSIS — R42 DIZZINESS: ICD-10-CM

## 2020-02-13 DIAGNOSIS — R00.0 RAPID HEART RATE: ICD-10-CM

## 2020-02-13 DIAGNOSIS — I10 ESSENTIAL HYPERTENSION: Primary | ICD-10-CM

## 2020-02-13 DIAGNOSIS — R00.2 PALPITATIONS: ICD-10-CM

## 2020-02-13 DIAGNOSIS — E66.01 SEVERE OBESITY (HCC): ICD-10-CM

## 2020-02-13 RX ORDER — CARVEDILOL 25 MG/1
25 TABLET ORAL 2 TIMES DAILY WITH MEALS
Qty: 60 TAB | Refills: 11 | Status: SHIPPED | OUTPATIENT
Start: 2020-02-13

## 2020-02-13 RX ORDER — AMLODIPINE BESYLATE 5 MG/1
5 TABLET ORAL DAILY
Qty: 30 TAB | Refills: 11 | Status: SHIPPED | OUTPATIENT
Start: 2020-02-13 | End: 2021-02-17 | Stop reason: SDUPTHER

## 2020-02-13 RX ORDER — LOSARTAN POTASSIUM AND HYDROCHLOROTHIAZIDE 12.5; 1 MG/1; MG/1
1 TABLET ORAL DAILY
Qty: 30 TAB | Refills: 11 | Status: SHIPPED | OUTPATIENT
Start: 2020-02-13 | End: 2021-02-17 | Stop reason: SDUPTHER

## 2020-02-13 NOTE — PROGRESS NOTES
Visit Vitals  /84 (BP 1 Location: Left arm, BP Patient Position: Sitting)   Pulse 82   Resp 17   Ht 5' 7\" (1.702 m)   Wt 232 lb (105.2 kg)   SpO2 97%   BMI 36.34 kg/m²

## 2020-02-13 NOTE — PROGRESS NOTES
HISTORY OF PRESENT ILLNESS  Suzon Sandhoff is a 53 y.o. female. She has hypertension and when I saw her most recently, I changed her regimen. She developed worsening headache and went to the emergency room with nausea but no vomiting. She was given IV fluids and released. She states that sometimes walking into work her heart rate will go up to 135 on her Fitbit. We told her by phone to double the dosage of losartan with HCTZ so she is now taking 100/25 mg. She was also told to increase her carvedilol to 20 mg twice daily. She is feeling somewhat better but still has the headache to some degree. HPI  Patient Active Problem List   Diagnosis Code    Tear of meniscus of left knee S83.207A    Severe obesity ([facility] [address] E66.01     Current Outpatient Medications   Medication Sig Dispense Refill    carvediloL (COREG) 25 mg tablet Take 1 Tab by mouth two (2) times daily (with meals). 60 Tab 11    losartan-hydroCHLOROthiazide (HYZAAR) 100-12.5 mg per tablet Take 1 Tab by mouth daily. 30 Tab 11    amLODIPine (NORVASC) 5 mg tablet Take 1 Tab by mouth daily. 30 Tab 11    butalbital-acetaminophen-caff (FIORICET) -40 mg per capsule Take 1 Cap by mouth every four (4) hours as needed for Headache. 15 Cap 0    SITagliptin-metFORMIN (JANUMET) 50-1,000 mg per tablet Take 1 Tab by mouth two (2) times a day.  atorvastatin (LIPITOR) 10 mg tablet Take 1 Tab by mouth daily.  aspirin 81 mg chewable tablet Take 81 mg by mouth daily.        Past Medical History:   Diagnosis Date    Diabetes ([facility] [address]     GERD (gastroesophageal reflux disease)     Heart murmur     Hyperlipemia     Hypertension     Tachycardia      Past Surgical History:   Procedure Laterality Date    BREAST SURGERY PROCEDURE UNLISTED      LEFT BREAST MILK DUCT REMOVED    CARDIAC SURG PROCEDURE UNLIST  1/2015    CARDIAC CATH    HX BREAST BIOPSY Left 1990,2003,2008    all benign    HX HEENT      ZACARIAS EYE MUSCLE TIGHTENING AS CHILD    HX ISABEL AND BSO      HX TONSIL AND ADENOIDECTOMY      AGE 15    HX TUBAL LIGATION         Review of Systems   Cardiovascular: Positive for palpitations. Gastrointestinal: Positive for nausea. Neurological: Positive for headaches. All other systems reviewed and are negative. Visit Vitals  /84 (BP 1 Location: Left arm, BP Patient Position: Sitting)   Pulse 82   Resp 17   Ht 5' 7\" (1.702 m)   Wt 232 lb (105.2 kg)   SpO2 97%   BMI 36.34 kg/m²       Physical Exam  Vitals signs and nursing note reviewed. Constitutional:       Appearance: She is obese. HENT:      Head: Normocephalic. Right Ear: External ear normal.      Nose: Nose normal.      Mouth/Throat:      Mouth: Mucous membranes are moist.   Neck:      Musculoskeletal: Normal range of motion. Cardiovascular:      Rate and Rhythm: Normal rate and regular rhythm. Heart sounds: No murmur. No friction rub. No gallop. Pulmonary:      Effort: No respiratory distress. Breath sounds: No wheezing. Abdominal:      Palpations: Abdomen is soft. Musculoskeletal:         General: No swelling. Skin:     Coloration: Skin is not jaundiced. Neurological:      General: No focal deficit present. Mental Status: She is alert. Psychiatric:         Behavior: Behavior normal.         ASSESSMENT and PLAN  Her blood pressure is still elevated but it is improved. I will switch her to once daily losartan 100 mg with only 12.5 mg of HCTZ, since she previously took dyazide, presumably for potassium sparing. I will also give her a prescription for the 20 mg carvedilol pill to take twice daily. I will additionally add amlodipine 5 mg daily to her regimen. She previously has a 48-hour Holter monitor that did not show any tachyarrhythmias but it sounds like she may still be having something like this. Therefore, I will order a 30-day monitor in this regard and I will see her back in four weeks.

## 2020-02-25 NOTE — TELEPHONE ENCOUNTER
Patient states that she was asked to call and let Dr Daniel Douglas know how her blood pressure has been. She states that her systolic numbers are 967-121 and diastolic is always about 90. Patient would also like to let Dr Daniel Douglas know that her heart rate has stabilized so she is mailing the monitor back and will not be wearing It.      Phone: [phone]

## 2020-02-27 ENCOUNTER — TELEPHONE (OUTPATIENT)
Dept: CARDIOLOGY CLINIC | Age: 55
End: 2020-02-27

## 2020-02-27 NOTE — TELEPHONE ENCOUNTER
----- Message from Ivone Rolon RN sent at 2/26/2020  2:25 PM EST -----  Regarding: FW:BP  Contact: [phone]    ----- Message -----  From: Chanda Dennis  Sent: 2/26/2020   2:14 PM EST  To: [facility]  Subject: RE:BP                                            Headaches are just about everyday in the occipital area, they are not severe but they are approx a 4-5 on the pain scale. I add no additionally salt to my food nor do I eat high sodium foods. The new medications have stabilized my heart rate, however my blood pressure has increased so it's probably a matter of getting the dosage worked out. I do realize it's takes time and I appreciate all your help. Thank you  ----- Message -----  From: Frances Tee RN  Sent: 2/26/2020  1:59 PM EST  To: Chanda Dennis  Subject: RE:BP  The diastolic is on the higher side, which is why I think Dr. Sandra Moore said it was [facility] for now. \" Are you having any headaches or problems when your BP is a little elevated? Do you watch your salt intake and take all your BP meds we have on list? If any symptoms or BP goes up any more, I could ask Dr. Sandra Moore about maybe increasing your amlodipine?      ----- Message -----     From: Chanda Dennis     Sent: 2/26/2020 11:47 AM EST       To: Frances Tee RN  Subject: RE:BP    Hi [facility] so it is okay for my diastolic to range from 30-95 each day? It was 134/98 this morning  ----- Message -----  From: Frances Tee RN  Sent: 2/26/2020 11:02 AM EST  To: [facility]  Subject: BP  I just wanted to let you know we received your message about your blood pressure and returning monitor. Dr. Sandra Moore said that was all okay for now.

## 2020-02-27 NOTE — TELEPHONE ENCOUNTER
Dr. Trupti Figueroa-  Patient's BP is still elevated (DBPs in 90's) and she is experiencing some headaches. Could she maybe try increasing her amlodipine? Her f/u with you is on 3/12.     Future Appointments   Date Time Provider Otcavia Gallo   3/12/2020  9:20 AM German Yanes  [address]

## 2020-10-28 ENCOUNTER — TRANSCRIBE ORDER (OUTPATIENT)
Dept: SCHEDULING | Age: 55
End: 2020-10-28

## 2020-10-28 DIAGNOSIS — Z12.31 VISIT FOR SCREENING MAMMOGRAM: Primary | ICD-10-CM

## 2020-12-08 ENCOUNTER — HOSPITAL ENCOUNTER (OUTPATIENT)
Dept: MAMMOGRAPHY | Age: 55
Discharge: HOME | End: 2020-12-08
Attending: FAMILY MEDICINE
Payer: COMMERCIAL

## 2020-12-08 DIAGNOSIS — Z12.31 VISIT FOR SCREENING MAMMOGRAM: ICD-10-CM

## 2020-12-08 PROCEDURE — 77063 BREAST TOMOSYNTHESIS BI: CPT

## 2021-02-17 RX ORDER — LOSARTAN POTASSIUM AND HYDROCHLOROTHIAZIDE 12.5; 1 MG/1; MG/1
1 TABLET ORAL DAILY
Qty: 30 TAB | Refills: 11 | Status: SHIPPED | OUTPATIENT
Start: 2021-02-17

## 2021-02-17 RX ORDER — AMLODIPINE BESYLATE 5 MG/1
5 TABLET ORAL DAILY
Qty: 30 TAB | Refills: 11 | Status: SHIPPED | OUTPATIENT
Start: 2021-02-17

## 2021-02-17 NOTE — TELEPHONE ENCOUNTER
Cardiologist: Dr. Shanda Siu    Last appt: Visit date not found  No future appointments. Requested Prescriptions     Signed Prescriptions Disp Refills    amLODIPine (NORVASC) 5 mg tablet 30 Tab 11     Sig: Take 1 Tab by mouth daily. Authorizing Provider: Adolfo Pereira     Ordering User: Abilio Paiz losartan-hydroCHLOROthiazide (HYZAAR) 100-12.5 mg per tablet 30 Tab 11     Sig: Take 1 Tab by mouth daily. Authorizing Provider: Adolfo Pereira     Ordering User: Giacomo Cochran         Refills VO per Dr. Shanda Siu.

## 2021-08-03 NOTE — TELEPHONE ENCOUNTER
Called patient. Verified patient's identity with two identifiers. Notified patient of holter results and advised to still keep appointments for next week. Patient agreed and denied further questions or concerns.
Dr. Natalie Alarcon-  Patient holter monitor results are on your desk for review. She is scheduled for an echo and f/u with you on 1/28, but will call with results and any recommendations.      Future Appointments   Date Time Provider Octavia Gallo   1/28/2020 11:00 AM ECHO, [address] [facility]   1/28/2020 11:40 AM Pancho Ayala  [address]
Lionel Severs, MD Tonette Manus, RN   Caller: Unspecified (2 days ago, 10:56 AM)             No major abnormalities
No

## 2021-12-08 ENCOUNTER — TRANSCRIBE ORDER (OUTPATIENT)
Dept: SCHEDULING | Age: 56
End: 2021-12-08

## 2021-12-08 DIAGNOSIS — Z12.31 VISIT FOR SCREENING MAMMOGRAM: Primary | ICD-10-CM

## 2022-01-06 ENCOUNTER — HOSPITAL ENCOUNTER (OUTPATIENT)
Dept: MAMMOGRAPHY | Age: 57
Discharge: HOME | End: 2022-01-06
Attending: FAMILY MEDICINE
Payer: COMMERCIAL

## 2022-01-06 DIAGNOSIS — Z12.31 VISIT FOR SCREENING MAMMOGRAM: ICD-10-CM

## 2022-01-06 PROCEDURE — 77063 BREAST TOMOSYNTHESIS BI: CPT

## 2022-03-19 PROBLEM — E66.01 SEVERE OBESITY (HCC): Status: ACTIVE | Noted: 2020-01-13

## 2022-03-26 ENCOUNTER — APPOINTMENT (OUTPATIENT)
Dept: VASCULAR SURGERY | Age: 57
End: 2022-03-26
Attending: EMERGENCY MEDICINE
Payer: COMMERCIAL

## 2022-03-26 ENCOUNTER — HOSPITAL ENCOUNTER (EMERGENCY)
Age: 57
Discharge: HOME | End: 2022-03-26
Attending: EMERGENCY MEDICINE
Payer: COMMERCIAL

## 2022-03-26 ENCOUNTER — APPOINTMENT (OUTPATIENT)
Dept: CT IMAGING | Age: 57
End: 2022-03-26
Attending: EMERGENCY MEDICINE
Payer: COMMERCIAL

## 2022-03-26 VITALS
OXYGEN SATURATION: 97 % | RESPIRATION RATE: 16 BRPM | SYSTOLIC BLOOD PRESSURE: 127 MMHG | TEMPERATURE: 98.2 F | WEIGHT: 180 LBS | BODY MASS INDEX: 27.28 KG/M2 | HEART RATE: 85 BPM | HEIGHT: 68 IN | DIASTOLIC BLOOD PRESSURE: 87 MMHG

## 2022-03-26 DIAGNOSIS — M54.10 RADICULOPATHY AFFECTING UPPER EXTREMITY: Primary | ICD-10-CM

## 2022-03-26 PROCEDURE — 99284 EMERGENCY DEPT VISIT MOD MDM: CPT

## 2022-03-26 PROCEDURE — 74011250637 HC RX REV CODE- 250/637: Performed by: EMERGENCY MEDICINE

## 2022-03-26 PROCEDURE — 93971 EXTREMITY STUDY: CPT

## 2022-03-26 PROCEDURE — 72125 CT NECK SPINE W/O DYE: CPT

## 2022-03-26 RX ORDER — GABAPENTIN 100 MG/1
300 CAPSULE ORAL
Status: COMPLETED | OUTPATIENT
Start: 2022-03-26 | End: 2022-03-26

## 2022-03-26 RX ORDER — ONDANSETRON 4 MG/1
4 TABLET, ORALLY DISINTEGRATING ORAL
Status: COMPLETED | OUTPATIENT
Start: 2022-03-26 | End: 2022-03-26

## 2022-03-26 RX ORDER — HYDROCODONE BITARTRATE AND ACETAMINOPHEN 7.5; 325 MG/1; MG/1
1 TABLET ORAL
Qty: 9 TABLET | Refills: 0 | Status: SHIPPED | OUTPATIENT
Start: 2022-03-26 | End: 2022-03-29

## 2022-03-26 RX ORDER — METHOCARBAMOL 750 MG/1
750 TABLET, FILM COATED ORAL 3 TIMES DAILY
Qty: 20 TABLET | Refills: 0 | Status: SHIPPED | OUTPATIENT
Start: 2022-03-26

## 2022-03-26 RX ORDER — METHOCARBAMOL 750 MG/1
750 TABLET, FILM COATED ORAL
Status: COMPLETED | OUTPATIENT
Start: 2022-03-26 | End: 2022-03-26

## 2022-03-26 RX ORDER — HYDROCODONE BITARTRATE AND ACETAMINOPHEN 10; 325 MG/1; MG/1
1 TABLET ORAL
Status: COMPLETED | OUTPATIENT
Start: 2022-03-26 | End: 2022-03-26

## 2022-03-26 RX ORDER — GABAPENTIN 300 MG/1
300 CAPSULE ORAL 3 TIMES DAILY
Qty: 20 CAPSULE | Refills: 0 | Status: SHIPPED | OUTPATIENT
Start: 2022-03-26

## 2022-03-26 RX ADMIN — METHOCARBAMOL 750 MG: 750 TABLET ORAL at 11:28

## 2022-03-26 RX ADMIN — GABAPENTIN 300 MG: 100 CAPSULE ORAL at 11:28

## 2022-03-26 RX ADMIN — ONDANSETRON 4 MG: 4 TABLET, ORALLY DISINTEGRATING ORAL at 11:28

## 2022-03-26 RX ADMIN — HYDROCODONE BITARTRATE AND ACETAMINOPHEN 1 TABLET: 10; 325 TABLET ORAL at 11:28

## 2022-03-26 NOTE — Clinical Note
Glendy Phillips was seen and treated in our emergency department on 3/26/2022. Glendy Phillips can not return to work until 3/30/2022.      Spencer Colin MD

## 2022-03-26 NOTE — Clinical Note
Rod Isbell was seen and treated in our emergency department on 3/26/2022. Rod Isbell can not return to work until 3/30/2022.      Meg Davidson MD

## 2022-03-26 NOTE — ED PROVIDER NOTES
HPI   Patient is a 58-year-old female with past medical history significant for GERD, type 2 diabetes, hyperlipidemia, hypertension, tachycardia who presents to the ED with progressively worsening right-sided neck pain with radiculopathy to the right arm since Tuesday. She is also experiencing some left-sided shoulder upper arm tenderness. Pain increases in the right shoulder/neck area with any active range of motion of the right arm. She denies any falls, direct injury or blunt trauma. She has been taking Motrin without relief. She is left-hand dominant. Past Medical History:   Diagnosis Date    Diabetes ([address]     GERD (gastroesophageal reflux disease)     Heart murmur     Hyperlipemia     Hypertension     Inverted nipple     patient states right nipple inversion \"is not new\".      Tachycardia        Past Surgical History:   Procedure Laterality Date    HX BREAST BIOPSY Left [id number]    all benign    HX HEENT      ZACARIAS EYE MUSCLE TIGHTENING AS CHILD    HX ISABEL AND BSO      HX TONSIL AND ADENOIDECTOMY      AGE 16    HX TUBAL LIGATION      RI BREAST SURGERY PROCEDURE UNLISTED      LEFT BREAST MILK DUCT REMOVED    RI CARDIAC SURG PROCEDURE UNLIST  1/2015    CARDIAC CATH         Family History:   Problem Relation Age of Onset    Diabetes Mother     Elevated Lipids Mother     Hypertension Mother     Heart Disease Father         AFIB    Hypertension Father    Don Petties Elevated Lipids Father     Diabetes Father     Cancer Brother         BRAIN    Breast Cancer Maternal Aunt         Age at diagnosis unknown     Anesth Problems Neg Hx        Social History     Socioeconomic History    Marital status:      Spouse name: Not on file    Number of children: Not on file    Years of education: Not on file    Highest education level: Not on file   Occupational History    Not on file   Tobacco Use    Smoking status: Never Smoker    Smokeless tobacco: Never Used   Substance and Sexual Activity  Alcohol use: Yes     Comment: SOCIALLY    Drug use: Never    Sexual activity: Not on file   Other Topics Concern    Not on file   Social History Narrative    Not on file     Social Determinants of Health     Financial Resource Strain:     Difficulty of Paying Living Expenses: Not on file   Food Insecurity:     Worried About Running Out of Food in the Last Year: Not on file    Destiny of Food in the Last Year: Not on file   Transportation Needs:     Lack of Transportation (Medical): Not on file    Lack of Transportation (Non-Medical): Not on file   Physical Activity:     Days of Exercise per Week: Not on file    Minutes of Exercise per Session: Not on file   Stress:     Feeling of Stress : Not on file   Social Connections:     Frequency of Communication with Friends and Family: Not on file    Frequency of Social Gatherings with Friends and Family: Not on file    Attends Faith Services: Not on file    Active Member of [address] [place] [address] or Organizations: Not on file    Attends Club or Organization Meetings: Not on file    Marital Status: Not on file   Intimate Partner Violence:     Fear of Current or Ex-Partner: Not on file    Emotionally Abused: Not on file    Physically Abused: Not on file    Sexually Abused: Not on file   Housing Stability:     Unable to Pay for Housing in the Last Year: Not on file    Number of [place] in the Last Year: Not on file    Unstable Housing in the Last Year: Not on file         ALLERGIES: Patient has no known allergies. Review of Systems   Constitutional: Negative for activity change, appetite change, fever and unexpected weight change. HENT: Negative for congestion and trouble swallowing. Eyes: Negative for visual disturbance. Respiratory: Negative for cough and shortness of breath. Cardiovascular: Negative for chest pain, palpitations and leg swelling. Gastrointestinal: Negative for abdominal pain, diarrhea, nausea and vomiting.    Genitourinary: Negative for dysuria and flank pain. Musculoskeletal: Positive for arthralgias, myalgias and neck pain. Negative for back pain and gait problem. Skin: Negative for color change, rash and wound. Neurological: Negative for dizziness, light-headedness and headaches. All other systems reviewed and are negative. Vitals:    03/26/22 1104   BP: 120/84   Pulse: 93   Resp: 16   Temp: 98.1 °F (36.7 °C)   SpO2: 100%   Weight: 81.6 kg (180 lb)   Height: 5' 8\" (1.727 m)            Physical Exam  Vitals and nursing note reviewed. Constitutional:       General: She is not in acute distress. Appearance: Normal appearance. She is normal weight. She is not ill-appearing, toxic-appearing or diaphoretic. 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 Cardiovascular:      Rate and Rhythm: Normal rate and regular rhythm. Pulmonary:      Effort: Pulmonary effort is normal.      Breath sounds: Normal breath sounds. Musculoskeletal:         General: Tenderness present. No swelling, deformity or signs of injury. Cervical back: Normal range of motion and neck supple. Tenderness present. Comments: Reports right shoulder area pain with active range of motion of the right arm; patient feels the pain radiates from the right side of her neck to her right fingertips; Skin integrity is intact. There is no obvious bony or soft tissue deformity; rash, bruising or erythema. Good neurovascular sensation. No apparent tendon or nerve injury. Lymphadenopathy:      Cervical: No cervical adenopathy. Skin:     General: Skin is warm and dry. Findings: No bruising, erythema or rash. Neurological:      Mental Status: She is alert and oriented to person, place, and time. MDM       Procedures    CT SPINE CERV WO CONT    Result Date: 3/26/2022  Unremarkable CT through the cervical spine.     Duplex upper extremity venous right arm reveals no evidence of thrombosis in the right upper extremity. Patient has been reexamined and reports some relief of pain from medications given. Recommend close follow-up with orthopedic specialist and/or neurosurgery for further evaluation and treatment. Will prescribe short course of medications given here for symptom control. Patient's results and plan of care have been reviewed with the pt and her . Patient and/or family have verbally conveyed their understanding and agreement of the patient's signs, symptoms, diagnosis, treatment and prognosis and additionally agree to follow up as recommended or return to the Emergency Room should her condition change prior to follow-up. Discharge instructions have also been provided to the patient with some educational information regarding her diagnosis as well a list of reasons why she would want to return to the ER prior to her follow-up appointment should her condition change. Nisha Rodriguez NP

## 2022-03-26 NOTE — ED TRIAGE NOTES
Pt arrives from home with c/o R side neck pain radiating down arm and now is moving to L side x few days. Pt states \"12/10\" pain.

## 2022-03-28 ENCOUNTER — OFFICE VISIT (OUTPATIENT)
Dept: ORTHOPEDIC SURGERY | Age: 57
End: 2022-03-28
Payer: COMMERCIAL

## 2022-03-28 VITALS — HEIGHT: 68 IN | BODY MASS INDEX: 27.28 KG/M2 | WEIGHT: 180 LBS

## 2022-03-28 DIAGNOSIS — M75.52 SUBACROMIAL BURSITIS OF BOTH SHOULDERS: ICD-10-CM

## 2022-03-28 DIAGNOSIS — M25.512 ACUTE PAIN OF LEFT SHOULDER: Primary | ICD-10-CM

## 2022-03-28 DIAGNOSIS — M25.511 ACUTE PAIN OF RIGHT SHOULDER: ICD-10-CM

## 2022-03-28 DIAGNOSIS — M75.51 SUBACROMIAL BURSITIS OF BOTH SHOULDERS: ICD-10-CM

## 2022-03-28 PROCEDURE — 99203 OFFICE O/P NEW LOW 30 MIN: CPT | Performed by: ORTHOPAEDIC SURGERY

## 2022-03-28 RX ORDER — ASCORBIC ACID 500 MG
TABLET ORAL
COMMUNITY

## 2022-03-28 RX ORDER — METHYLPREDNISOLONE 4 MG/1
TABLET ORAL
Qty: 1 DOSE PACK | Refills: 0 | Status: SHIPPED | OUTPATIENT
Start: 2022-03-28

## 2022-03-28 RX ORDER — BLOOD SUGAR DIAGNOSTIC
STRIP MISCELLANEOUS
COMMUNITY
Start: 2022-03-07

## 2022-03-28 RX ORDER — LANCETS 33 GAUGE
EACH MISCELLANEOUS
COMMUNITY
Start: 2022-03-07

## 2022-03-28 RX ORDER — METFORMIN HYDROCHLORIDE 1000 MG/1
1000 TABLET ORAL 2 TIMES DAILY
COMMUNITY
Start: 2022-02-20

## 2022-03-28 RX ORDER — SEMAGLUTIDE 1.34 MG/ML
INJECTION, SOLUTION SUBCUTANEOUS
COMMUNITY
Start: 2022-01-20

## 2022-03-28 RX ORDER — CHOLECALCIFEROL (VITAMIN D3) 125 MCG
CAPSULE ORAL
COMMUNITY

## 2022-03-28 NOTE — LETTER
NOTIFICATION RETURN TO WORK / SCHOOL    3/28/2022 11:52 AM    Ms. Wade Rodriguez [id number]      To Whom It May Concern:    Eitan Dugan is currently under the care of [facility]. She will return to work/school on: April 4, 2022    If there are questions or concerns please have the patient contact our office.         Sincerely,      Maria Dolores Anderson MD

## 2022-03-28 NOTE — PROGRESS NOTES
Paul Nolasco (: 1965) is a 61 y.o. female patient here for evaluation of the following chief complaint(s):  Shoulder Pain (bilateral)       ASSESSMENT/PLAN:  Below is the assessment and plan developed based on review of pertinent history, physical exam, labs, studies, and medications. 1. Acute pain of left shoulder  -     XR SHOULDER LT AP/LAT MIN 2 V; Future  2. Acute pain of right shoulder  -     XR SHOULDER RT AP/LAT MIN 2 V; Future  3. Subacromial bursitis of both shoulders  -     methylPREDNISolone (MEDROL DOSEPACK) 4 mg tablet; Per dose pack instructions, Normal, Disp-1 Dose Pack, R-0      We discussed possible causes of her bilateral shoulder pain and discussed treatment options. For now we will begin with a Medrol Dosepak to see if this alleviates some symptoms. Currently her exam is not consistent with cervical radiculopathy and seems to be more consistent with subacromial bursitis, calcific tendinitis and impingement. If she is no better in 1 to 2 weeks may consider NSAIDs and physical therapy. Eventually if no better would consider MRI. If symptoms change to be more consistent with radiculopathy would refer to spine. Patient verbalized understanding and elected to proceed. All questions were answered to the patient's apparent satisfaction. SUBJECTIVE/OBJECTIVE:  HPI    13-year-old female with bilateral shoulder pain. She is not had any severe pain in the past but this is been going on about 1 week. She feels it in the shoulders radiating down to the arms. Denies any numbness or tingling. Was seen to the emergency department where she had a C-spine CT scan which was read as normal.  She was then referred to orthopedics and neurosurgery. Patient reports a sudden onset of symptoms. Duration of problem 1 week.   Symptom Severity 10/10  Symptom Frequency constant        No Known Allergies    Current Outpatient Medications   Medication Sig    ascorbic acid, vitamin C, (VITAMIN C) 500 mg tablet 1 tab(s)    OneTouch Verio test strips strip USE 3 TO 4 TIMES DAILY AS DIRECTED    cholecalciferol (VITAMIN D3) (5000 Units /125 mcg) capsule 1 cap(s)    OneTouch Delica Plus Lancet 33 gauge misc USE AS DIRECTED TO TEST BLOOD GLUCOSE TWICE DAILY AS NEEDED    metFORMIN (GLUCOPHAGE) 1,000 mg tablet Take 1,000 mg by mouth two (2) times a day.  Ozempic 0.25 mg or 0.5 mg/dose (2 mg/1.5 ml) subq pen INJECT 0.5 MG SUBCUTANEOUSLY ONCE A WEEK    methylPREDNISolone (MEDROL DOSEPACK) 4 mg tablet Per dose pack instructions    HYDROcodone-acetaminophen (Norco) 7.5-325 mg per tablet Take 1 Tablet by mouth every eight (8) hours as needed for Pain for up to 3 days. Max Daily Amount: 3 Tablets.  gabapentin (Neurontin) 300 mg capsule Take 1 Capsule by mouth three (3) times daily. Max Daily Amount: 900 mg.    methocarbamoL (Robaxin-750) 750 mg tablet Take 1 Tablet by mouth three (3) times daily.  amLODIPine (NORVASC) 5 mg tablet Take 1 Tab by mouth daily.  losartan-hydroCHLOROthiazide (HYZAAR) 100-12.5 mg per tablet Take 1 Tab by mouth daily.  carvediloL (COREG) 25 mg tablet Take 1 Tab by mouth two (2) times daily (with meals).  atorvastatin (LIPITOR) 10 mg tablet Take 1 Tab by mouth daily.  aspirin 81 mg chewable tablet Take 81 mg by mouth daily. No current facility-administered medications for this visit.        Social History     Socioeconomic History    Marital status:      Spouse name: Not on file    Number of children: Not on file    Years of education: Not on file    Highest education level: Not on file   Occupational History    Not on file   Tobacco Use    Smoking status: Never Smoker    Smokeless tobacco: Never Used   Substance and Sexual Activity    Alcohol use: Yes     Comment: SOCIALLY    Drug use: Never    Sexual activity: Not on file   Other Topics Concern    Not on file   Social History Narrative    Not on file     Social Determinants of Health Financial Resource Strain:     Difficulty of Paying Living Expenses: Not on file   Food Insecurity:     Worried About Running Out of Food in the Last Year: Not on file    Destiny of Food in the Last Year: Not on file   Transportation Needs:     Lack of Transportation (Medical): Not on file    Lack of Transportation (Non-Medical):  Not on file   Physical Activity:     Days of Exercise per Week: Not on file    Minutes of Exercise per Session: Not on file   Stress:     Feeling of Stress : Not on file   Social Connections:     Frequency of Communication with Friends and Family: Not on file    Frequency of Social Gatherings with Friends and Family: Not on file    Attends Rastafarian Services: Not on file    Active Member of [address] [place] [address] [employer] or Organizations: Not on file    Attends Club or Organization Meetings: Not on file    Marital Status: Not on file   Intimate Partner Violence:     Fear of Current or Ex-Partner: Not on file    Emotionally Abused: Not on file    Physically Abused: Not on file    Sexually Abused: Not on file   Housing Stability:     Unable to Pay for Housing in the Last Year: Not on file    Number of Places Lived in the Last Year: Not on file    Unstable Housing in the Last Year: Not on file       Past Surgical History:   Procedure Laterality Date    HX BREAST BIOPSY Left [id number],[id number]    all benign    HX HEENT      ZACARIAS EYE MUSCLE TIGHTENING AS CHILD    HX ISABEL AND BSO      HX TONSIL AND ADENOIDECTOMY      AGE 16    HX TUBAL LIGATION      IA BREAST SURGERY PROCEDURE UNLISTED      LEFT BREAST MILK DUCT REMOVED    IA CARDIAC SURG PROCEDURE UNLIST  1/2015    CARDIAC CATH       Family History   Problem Relation Age of Onset    Diabetes Mother     Elevated Lipids Mother     Hypertension Mother     Heart Disease Father         AFIB    Hypertension Father     Elevated Lipids Father     Diabetes Father     Cancer Brother         BRAIN    Breast Cancer Maternal Aunt         Age at diagnosis unknown     Anesth Problems Neg Hx             Review of Systems    No flowsheet data found. Vitals:  Ht 5' 8\" (1.727 m)   Wt 180 lb (81.6 kg)   BMI 27.37 kg/m²    Estimated body surface area is 1.98 meters squared as calculated from the following:    Height as of this encounter: 5' 8\" (1.727 m). Weight as of this encounter: 180 lb (81.6 kg). Body mass index is 27.37 kg/m². Physical Exam    Musculoskeletal Exam:    Bilateral SHOULDER EXAM    ROM:    - FF 30 degrees   - ABD 20 degrees   - ER 60   - IR Buttocks    Strength:    - Supraspinatus 4/5   - Infraspinatus 4/5   - Subscapularis 4/5    AC Joint TTP: Positive    Bicipital Groove TTP: Positive      Patient fires AIN, PIN and ulnar nerves. Sensation is grossly intact in the median, radial and ulnar distribution. Hand is pink and appears well-perfused. Hand is warm. Skin is intact. Compartments are soft and compressible. Today her exam is very guarded so many provocative maneuvers were unable to be obtained. Consitutional: Healthy  Skin:   - Edema - none  - Cellulitis - No    Neuro: Numbness or tingling in R/L arm: No    Psych: Affect normal    Cardiovascular: Capillary Refill < 2 seconds in upper extremities    Respiratory: Non-Labored Breathing    ROS:    Constitutional: Denies fever/chills    Respiratory: Denies SOB        Imaging:    XR Results (maximum last 2): Results from Appointment encounter on 03/28/22    XR SHOULDER LT AP/LAT MIN 2 V    Narrative  Left Shoulder Xray:  Indication: pain  Views: 4 views - AP, Grashey, Axillary, Scap-Y  Interpretation:  -4 views of the left shoulder are reviewed and show normal bone architecture. The humerus is well located within the glenoid. There is no arthritis of the glenohumeral joint and mild at the acromioclavicular joint. There is no evidence of high riding of the humeral head. No evidence of fracture or subluxation. There is no evidence of soft tissue swelling.   There is some evidence of calcific tendinitis on the left shoulder. XR SHOULDER RT AP/LAT MIN 2 V    Narrative  Right Shoulder Xray:  Indication: pain  Views: 4 views - AP, Grashey, Axillary, Scap-Y  Interpretation:  -4 views of the right shoulder are reviewed and show normal bone architecture. The humerus is well located within the glenoid. There is no arthritis of the glenohumeral joint and mild arthritis at the acromioclavicular joint. There is no evidence of high riding of the humeral head. No evidence of fracture or subluxation. There is no evidence of soft tissue swelling. The acromion is downsloping with an osteophyte noted and there is some calcification noted consistent with calcific tendinitis. Orders Placed This Encounter    XR SHOULDER LT AP/LAT MIN 2 V     Standing Status:   Future     Number of Occurrences:   1     Standing Expiration Date:   4/28/2022     Order Specific Question:   Reason for Exam     Answer:   shoulder pain    XR SHOULDER RT AP/LAT MIN 2 V     Standing Status:   Future     Number of Occurrences:   1     Standing Expiration Date:   4/28/2022     Order Specific Question:   Reason for Exam     Answer:   Shoulder pain    methylPREDNISolone (MEDROL DOSEPACK) 4 mg tablet     Sig: Per dose pack instructions     Dispense:  1 Dose Pack     Refill:  0        An electronic signature was used to authenticate this note.   -- Carloz Pelayo MD

## 2022-04-05 ENCOUNTER — OFFICE VISIT (OUTPATIENT)
Dept: ORTHOPEDIC SURGERY | Age: 57
End: 2022-04-05
Payer: COMMERCIAL

## 2022-04-05 DIAGNOSIS — M75.51 SUBACROMIAL BURSITIS OF BOTH SHOULDERS: Primary | ICD-10-CM

## 2022-04-05 DIAGNOSIS — M75.52 SUBACROMIAL BURSITIS OF BOTH SHOULDERS: Primary | ICD-10-CM

## 2022-04-05 PROCEDURE — 99213 OFFICE O/P EST LOW 20 MIN: CPT | Performed by: ORTHOPAEDIC SURGERY

## 2022-04-05 RX ORDER — MELOXICAM 15 MG/1
15 TABLET ORAL DAILY
Qty: 30 TABLET | Refills: 0 | Status: SHIPPED | OUTPATIENT
Start: 2022-04-05 | End: 2022-05-04

## 2022-04-05 NOTE — PROGRESS NOTES
Ramy Guajardo (: 1965) is a 61 y.o. female patient here for evaluation of the following chief complaint(s):  Surgical Follow-up (right shoulder, still having pain in both shoulders Rt>LT)       ASSESSMENT/PLAN:  Below is the assessment and plan developed based on review of pertinent history, physical exam, labs, studies, and medications. 1. Subacromial bursitis of both shoulders      We discussed possible causes of her bilateral shoulder pain and discussed treatment options. Medrol Dosepak did not work very well for the right shoulder but the left shoulder did improve some. She wants to try Mobic and will also get in for an evaluation of her neck as she is having some neck pain and thinks that this may be the source of the pain. She was also given a referral for physical therapy. If no better in 3 to 4 weeks may consider an MRI of the right shoulder since it is the more painful 1. Patient verbalized understanding and elected to proceed. All questions were answered to the patient's apparent satisfaction. SUBJECTIVE/OBJECTIVE:  HPI    58-year-old female following up on her bilateral shoulder pain. The Medrol Dosepak helped her left shoulder but not the right shoulder. Patient reports a sudden onset of symptoms. Duration of problem 3 week. Symptom Severity 10/10  Symptom Frequency constant        No Known Allergies    Current Outpatient Medications   Medication Sig    ascorbic acid, vitamin C, (VITAMIN C) 500 mg tablet 1 tab(s)    OneTouch Verio test strips strip USE 3 TO 4 TIMES DAILY AS DIRECTED    cholecalciferol (VITAMIN D3) (5000 Units /125 mcg) capsule 1 cap(s)    OneTouch Delica Plus Lancet 33 gauge misc USE AS DIRECTED TO TEST BLOOD GLUCOSE TWICE DAILY AS NEEDED    metFORMIN (GLUCOPHAGE) 1,000 mg tablet Take 1,000 mg by mouth two (2) times a day.     Ozempic 0.25 mg or 0.5 mg/dose (2 mg/1.5 ml) subq pen INJECT 0.5 MG SUBCUTANEOUSLY ONCE A WEEK    methylPREDNISolone (MEDROL DOSEPACK) 4 mg tablet Per dose pack instructions    gabapentin (Neurontin) 300 mg capsule Take 1 Capsule by mouth three (3) times daily. Max Daily Amount: 900 mg.    methocarbamoL (Robaxin-750) 750 mg tablet Take 1 Tablet by mouth three (3) times daily.  amLODIPine (NORVASC) 5 mg tablet Take 1 Tab by mouth daily.  losartan-hydroCHLOROthiazide (HYZAAR) 100-12.5 mg per tablet Take 1 Tab by mouth daily.  carvediloL (COREG) 25 mg tablet Take 1 Tab by mouth two (2) times daily (with meals).  atorvastatin (LIPITOR) 10 mg tablet Take 1 Tab by mouth daily.  aspirin 81 mg chewable tablet Take 81 mg by mouth daily. No current facility-administered medications for this visit. Social History     Socioeconomic History    Marital status:      Spouse name: Not on file    Number of children: Not on file    Years of education: Not on file    Highest education level: Not on file   Occupational History    Not on file   Tobacco Use    Smoking status: Never Smoker    Smokeless tobacco: Never Used   Substance and Sexual Activity    Alcohol use: Yes     Comment: SOCIALLY    Drug use: Never    Sexual activity: Not on file   Other Topics Concern    Not on file   Social History Narrative    Not on file     Social Determinants of Health     Financial Resource Strain:     Difficulty of Paying Living Expenses: Not on file   Food Insecurity:     Worried About Running Out of Food in the Last Year: Not on file    Destiny of Food in the Last Year: Not on file   Transportation Needs:     Lack of Transportation (Medical): Not on file    Lack of Transportation (Non-Medical):  Not on file   Physical Activity:     Days of Exercise per Week: Not on file    Minutes of Exercise per Session: Not on file   Stress:     Feeling of Stress : Not on file   Social Connections:     Frequency of Communication with Friends and Family: Not on file    Frequency of Social Gatherings with Friends and Family: Not on file    Attends [employer] Services: Not on file    Active Member of Clubs or Organizations: Not on file    Attends Club or Organization Meetings: Not on file    Marital Status: Not on file   Intimate Partner Violence:     Fear of Current or Ex-Partner: Not on file    Emotionally Abused: Not on file    Physically Abused: Not on file    Sexually Abused: Not on file   Housing Stability:     Unable to Pay for Housing in the Last Year: Not on file    Number of Places Lived in the Last Year: Not on file    Unstable Housing in the Last Year: Not on file       Past Surgical History:   Procedure Laterality Date    HX BREAST BIOPSY Left [id number]    all benign    HX HEENT      ZACARIAS EYE MUSCLE TIGHTENING AS CHILD    HX ISABEL AND BSO      HX TONSIL AND ADENOIDECTOMY      AGE 16    HX TUBAL LIGATION      NE BREAST SURGERY PROCEDURE UNLISTED      LEFT BREAST MILK DUCT REMOVED    NE CARDIAC SURG PROCEDURE UNLIST  1/2015    CARDIAC CATH       Family History   Problem Relation Age of Onset    Diabetes Mother     Elevated Lipids Mother     Hypertension Mother     Heart Disease Father         AFIB    Hypertension Father     Elevated Lipids Father     Diabetes Father     Cancer Brother         BRAIN    Breast Cancer Maternal Aunt         Age at diagnosis unknown     Anesth Problems Neg Hx             Review of Systems    No flowsheet data found. Vitals: There were no vitals taken for this visit. Estimated body surface area is 1.98 meters squared as calculated from the following:    Height as of 3/28/22: 5' 8\" (1.727 m). Weight as of 3/28/22: 180 lb (81.6 kg). There is no height or weight on file to calculate BMI.        Physical Exam    Musculoskeletal Exam:    Bilateral SHOULDER EXAM    ROM:    - FF 30 degrees   - ABD 20 degrees   - ER 60   - IR Buttocks    Strength:    - Supraspinatus 4/5   - Infraspinatus 4/5   - Subscapularis 4/5    AC Joint TTP: Positive    Bicipital Groove TTP: Positive      Patient fires AIN, PIN and ulnar nerves. Sensation is grossly intact in the median, radial and ulnar distribution. Hand is pink and appears well-perfused. Hand is warm. Skin is intact. Compartments are soft and compressible. Today her exam is very guarded once again so many provocative maneuvers were unable to be obtained. Consitutional: Healthy  Skin:   - Edema - none  - Cellulitis - No    Neuro: Numbness or tingling in R/L arm: No    Psych: Affect normal    Cardiovascular: Capillary Refill < 2 seconds in upper extremities    Respiratory: Non-Labored Breathing    ROS:    Constitutional: Denies fever/chills    Respiratory: Denies SOB        Imaging:    XR Results (maximum last 2): Results from Appointment encounter on 03/28/22    XR SHOULDER LT AP/LAT MIN 2 V    Narrative  Left Shoulder Xray:  Indication: pain  Views: 4 views - AP, Grashey, Axillary, Scap-Y  Interpretation:  -4 views of the left shoulder are reviewed and show normal bone architecture. The humerus is well located within the glenoid. There is no arthritis of the glenohumeral joint and mild at the acromioclavicular joint. There is no evidence of high riding of the humeral head. No evidence of fracture or subluxation. There is no evidence of soft tissue swelling. There is some evidence of calcific tendinitis on the left shoulder. XR SHOULDER RT AP/LAT MIN 2 V    Narrative  Right Shoulder Xray:  Indication: pain  Views: 4 views - AP, Grashey, Axillary, Scap-Y  Interpretation:  -4 views of the right shoulder are reviewed and show normal bone architecture. The humerus is well located within the glenoid. There is no arthritis of the glenohumeral joint and mild arthritis at the acromioclavicular joint. There is no evidence of high riding of the humeral head. No evidence of fracture or subluxation. There is no evidence of soft tissue swelling.   The acromion is downsloping with an osteophyte noted and there is some calcification noted consistent with calcific tendinitis. No orders of the defined types were placed in this encounter. An electronic signature was used to authenticate this note.   -- Patsey Shone, MD

## 2022-04-05 NOTE — PROGRESS NOTES
Chief Complaint   Patient presents with    Surgical Follow-up     right shoulder, still having pain in both shoulders Rt>LT

## 2022-05-03 DIAGNOSIS — M75.52 SUBACROMIAL BURSITIS OF BOTH SHOULDERS: ICD-10-CM

## 2022-05-03 DIAGNOSIS — M75.51 SUBACROMIAL BURSITIS OF BOTH SHOULDERS: ICD-10-CM

## 2022-05-04 RX ORDER — MELOXICAM 15 MG/1
TABLET ORAL
Qty: 30 TABLET | Refills: 0 | Status: SHIPPED | OUTPATIENT
Start: 2022-05-04

## 2023-01-25 ENCOUNTER — TRANSCRIBE ORDER (OUTPATIENT)
Dept: SCHEDULING | Age: 58
End: 2023-01-25

## 2023-01-25 DIAGNOSIS — Z12.31 SCREENING MAMMOGRAM FOR HIGH-RISK PATIENT: Primary | ICD-10-CM

## 2023-02-27 ENCOUNTER — HOSPITAL ENCOUNTER (OUTPATIENT)
Dept: MAMMOGRAPHY | Age: 58
Discharge: HOME | End: 2023-02-27
Attending: FAMILY MEDICINE
Payer: COMMERCIAL

## 2023-02-27 DIAGNOSIS — Z12.31 SCREENING MAMMOGRAM FOR HIGH-RISK PATIENT: ICD-10-CM

## 2023-02-27 PROCEDURE — 77063 BREAST TOMOSYNTHESIS BI: CPT

## 2024-05-06 ENCOUNTER — HOSPITAL ENCOUNTER (OUTPATIENT)
Facility: HOSPITAL | Age: 59
Discharge: HOME | End: 2024-05-09
Payer: COMMERCIAL

## 2024-05-06 VITALS — WEIGHT: 170 LBS | HEIGHT: 68 IN | BODY MASS INDEX: 25.76 KG/M2

## 2024-05-06 DIAGNOSIS — Z12.31 VISIT FOR SCREENING MAMMOGRAM: ICD-10-CM

## 2024-05-06 PROCEDURE — 77063 BREAST TOMOSYNTHESIS BI: CPT

## 2024-08-12 ENCOUNTER — OFFICE VISIT (OUTPATIENT)
Age: 59
End: 2024-08-12
Payer: COMMERCIAL

## 2024-08-12 VITALS
SYSTOLIC BLOOD PRESSURE: 122 MMHG | DIASTOLIC BLOOD PRESSURE: 80 MMHG | BODY MASS INDEX: 30.64 KG/M2 | HEART RATE: 75 BPM | WEIGHT: 195.2 LBS | HEIGHT: 67 IN

## 2024-08-12 DIAGNOSIS — E04.1 THYROID NODULE: ICD-10-CM

## 2024-08-12 DIAGNOSIS — E11.9 TYPE 2 DIABETES MELLITUS WITHOUT COMPLICATION, WITHOUT LONG-TERM CURRENT USE OF INSULIN (HCC): ICD-10-CM

## 2024-08-12 DIAGNOSIS — E66.9 OBESITY (BMI 30-39.9): ICD-10-CM

## 2024-08-12 DIAGNOSIS — E11.9 TYPE 2 DIABETES MELLITUS WITHOUT COMPLICATION, WITHOUT LONG-TERM CURRENT USE OF INSULIN (HCC): Primary | ICD-10-CM

## 2024-08-12 DIAGNOSIS — E78.2 MIXED HYPERLIPIDEMIA: ICD-10-CM

## 2024-08-12 PROCEDURE — 99204 OFFICE O/P NEW MOD 45 MIN: CPT | Performed by: GENERAL ACUTE CARE HOSPITAL

## 2024-08-12 NOTE — PATIENT INSTRUCTIONS
PLAN FOR TODAY    Neck ultrasound complete at your earliest convenience,    We will plan to make the following changes to your diabetes medications:    Continue on metformin 1000 mg twice daily   Ozempic:  0.25 mg weekly for 2 weeks then increase to 0.5mg weekly   After that continue for 2 months on 0.5mg weekly   Then can increase to the 1 mg weekly dose     It will be important to continue checking your glucose just as you did previously.   I would like you at the very least to check you glucose during:    AM fasting before breakfast  Any other time that you are not feeling well.     Always provide a glucose log that is completed at every visit so that we can review the results of your home glucose together. Without this, it is not possible to make accurate changes to your diabetes regimen.    Jermey Pérez MD  [facility]     Diabetes and Meal Planning    Meal planning can be a key part of managing diabetes. Planning meals and snacks with the right balance of carbohydrate, protein, and fat can help you keep your blood sugar at the target level.  You don't have to eat special foods. You can eat what your family eats, including sweets once in a while. But you do have to pay attention to how often you eat and how much you eat of certain foods.    Your plate  The plate format is a simple way to help you manage how you eat. You plan meals by learning how much space each food should take on a plate. It can make it easier to keep your blood sugar level within your target range. It also helps you see if you're eating healthy portion sizes.  To use the plate format, you put non-starchy vegetables on half your plate. Add lean protein foods, such as fish, lean meats and poultry, or soy products, on one-quarter of the plate. Put a grain or starchy vegetable (such as brown rice or a potato) on the final quarter of the plate. You can add a small piece of fruit and some low-fat or fat-free milk or yogurt,

## 2024-08-12 NOTE — PROGRESS NOTES
[facility]  DR DONATO Bullard is a 59 y.o. female  has a past medical history of Diabetes (HCC), GERD (gastroesophageal reflux disease), Heart murmur, Hyperlipemia, Hypertension, Inverted nipple, and Tachycardia.       ASSESSMENT AND PLAN:     Type 2 diabetes Mellitus, uncontrolled   Complications: denies   Discussed the details of diabetes mellitus including pathophysiology and diabetes care and importance of achieving target blood sugar numbers for a goal a1c of less than 7%    She is worried about weight gain on Ozempic, will obtain TSH level, although I suspect lifestyle aspects are the main causes of weight gain.     She has been off Ozempic for 7-8 weeks     Medications:    Continue on metformin 1000 mg twice daily   Ozempic:  0.25 mg weekly for 2 weeks then increase to 0.5mg weekly   After that continue for 2 months on 0.5mg weekly   Then can increase to the 1 mg weekly dose     Check blood sugar 1 times per day  Referred for DM education   Discussed hypoglycemia management  Annual Ophthalm, Regular foot self checks and regular dental care  Discussed important lifestyle aspects and importance of staying active    Mixed Hyperlipidemia  Currently taking atorvastatin - 10 MG   No results found for: \"CHOL\", \"TRIG\", \"LDL\", \"LDLDIRECT\", \"HDL\", \"CHOLHDLRATIO\"   Obtain fasting labs    Hypertension  on losartan-hydroCHLOROthiazide - 100-12.5 MG    Microalbumin: No results found for: \"MALBCR\"   Blood pressure is well controlled today, cont current meds  On Aspirin: -    Obesity Body mass index is 30.57 kg/m².   Discussed important lifestyle modifications, will benefit from weight loss, restarted GLP-1 agonist     History of Thyroid nodule  Patient indicates she had neck imaging many years ago and had POC thyroid ultrasound and was told to monitor the nodules, no prior FNA, advised to obtain repeat neck ultrasound at this time   Denies symptoms of neck compression,

## 2024-08-14 LAB — TSH SERPL DL<=0.05 MIU/L-ACNC: 1.14 UIU/ML (ref 0.45–4.5)

## 2024-08-22 ENCOUNTER — HOSPITAL ENCOUNTER (OUTPATIENT)
Facility: HOSPITAL | Age: 59
Discharge: HOME | End: 2024-08-22
Attending: GENERAL ACUTE CARE HOSPITAL
Payer: COMMERCIAL

## 2024-08-22 DIAGNOSIS — E04.1 THYROID NODULE: ICD-10-CM

## 2024-08-22 PROCEDURE — 76536 US EXAM OF HEAD AND NECK: CPT

## 2024-10-04 DIAGNOSIS — E11.9 TYPE 2 DIABETES MELLITUS WITHOUT COMPLICATION, WITHOUT LONG-TERM CURRENT USE OF INSULIN (HCC): Primary | ICD-10-CM

## 2024-10-07 RX ORDER — SEMAGLUTIDE 1.34 MG/ML
INJECTION, SOLUTION SUBCUTANEOUS
Qty: 3 ML | Refills: 5 | Status: SHIPPED | OUTPATIENT
Start: 2024-10-07

## 2024-11-12 DIAGNOSIS — E11.9 TYPE 2 DIABETES MELLITUS WITHOUT COMPLICATION, WITHOUT LONG-TERM CURRENT USE OF INSULIN (HCC): ICD-10-CM

## 2024-11-16 LAB
ALBUMIN SERPL-MCNC: 4.3 G/DL (ref 3.8–4.9)
ALBUMIN/CREAT UR: <2 MG/G CREAT (ref 0–29)
ALP SERPL-CCNC: 109 IU/L (ref 44–121)
ALT SERPL-CCNC: 19 IU/L (ref 0–32)
AST SERPL-CCNC: 21 IU/L (ref 0–40)
BILIRUB SERPL-MCNC: 0.5 MG/DL (ref 0–1.2)
BUN SERPL-MCNC: 16 MG/DL (ref 8–27)
BUN/CREAT SERPL: 20 (ref 12–28)
CALCIUM SERPL-MCNC: 9.3 MG/DL (ref 8.7–10.3)
CHLORIDE SERPL-SCNC: 102 MMOL/L (ref 96–106)
CHOLEST SERPL-MCNC: 167 MG/DL (ref 100–199)
CO2 SERPL-SCNC: 24 MMOL/L (ref 20–29)
CREAT SERPL-MCNC: 0.81 MG/DL (ref 0.57–1)
CREAT UR-MCNC: 122.8 MG/DL
EGFRCR SERPLBLD CKD-EPI 2021: 83 ML/MIN/1.73
GLOBULIN SER CALC-MCNC: 2.5 G/DL (ref 1.5–4.5)
GLUCOSE SERPL-MCNC: 76 MG/DL (ref 70–99)
HBA1C MFR BLD: 6.2 % (ref 4.8–5.6)
HDLC SERPL-MCNC: 91 MG/DL
IMP & REVIEW OF LAB RESULTS: NORMAL
LDLC SERPL CALC-MCNC: 65 MG/DL (ref 0–99)
Lab: NORMAL
MICROALBUMIN UR-MCNC: <3 UG/ML
POTASSIUM SERPL-SCNC: 4 MMOL/L (ref 3.5–5.2)
PROT SERPL-MCNC: 6.8 G/DL (ref 6–8.5)
SODIUM SERPL-SCNC: 141 MMOL/L (ref 134–144)
TRIGL SERPL-MCNC: 51 MG/DL (ref 0–149)
VLDLC SERPL CALC-MCNC: 11 MG/DL (ref 5–40)

## 2025-04-21 DIAGNOSIS — E11.9 TYPE 2 DIABETES MELLITUS WITHOUT COMPLICATION, WITHOUT LONG-TERM CURRENT USE OF INSULIN (HCC): ICD-10-CM

## 2025-04-22 RX ORDER — SEMAGLUTIDE 0.68 MG/ML
INJECTION, SOLUTION SUBCUTANEOUS
Qty: 3 ML | Refills: 5 | Status: SHIPPED | OUTPATIENT
Start: 2025-04-22

## 2025-06-30 ENCOUNTER — TRANSCRIBE ORDERS (OUTPATIENT)
Facility: HOSPITAL | Age: 60
End: 2025-06-30

## 2025-06-30 DIAGNOSIS — Z12.31 VISIT FOR SCREENING MAMMOGRAM: Primary | ICD-10-CM

## 2025-07-03 ENCOUNTER — HOSPITAL ENCOUNTER (OUTPATIENT)
Facility: HOSPITAL | Age: 60
Discharge: HOME | End: 2025-07-03
Payer: COMMERCIAL

## 2025-07-03 VITALS — WEIGHT: 220 LBS | HEIGHT: 67 IN | BODY MASS INDEX: 34.53 KG/M2

## 2025-07-03 DIAGNOSIS — Z12.31 VISIT FOR SCREENING MAMMOGRAM: ICD-10-CM

## 2025-07-03 PROCEDURE — 77067 SCR MAMMO BI INCL CAD: CPT

## 2025-07-03 PROCEDURE — 77063 BREAST TOMOSYNTHESIS BI: CPT
